# Patient Record
Sex: MALE | Race: WHITE
[De-identification: names, ages, dates, MRNs, and addresses within clinical notes are randomized per-mention and may not be internally consistent; named-entity substitution may affect disease eponyms.]

---

## 2023-01-05 ENCOUNTER — HOSPITAL ENCOUNTER (INPATIENT)
Dept: HOSPITAL 47 - EC | Age: 18
LOS: 5 days | Discharge: HOME HEALTH | DRG: 853 | End: 2023-01-10
Attending: SURGERY | Admitting: SURGERY
Payer: COMMERCIAL

## 2023-01-05 DIAGNOSIS — F17.210: ICD-10-CM

## 2023-01-05 DIAGNOSIS — Z91.013: ICD-10-CM

## 2023-01-05 DIAGNOSIS — E66.09: ICD-10-CM

## 2023-01-05 DIAGNOSIS — K35.33: ICD-10-CM

## 2023-01-05 DIAGNOSIS — Z88.8: ICD-10-CM

## 2023-01-05 DIAGNOSIS — K66.0: ICD-10-CM

## 2023-01-05 DIAGNOSIS — Z79.899: ICD-10-CM

## 2023-01-05 DIAGNOSIS — F32.A: ICD-10-CM

## 2023-01-05 DIAGNOSIS — U07.1: ICD-10-CM

## 2023-01-05 DIAGNOSIS — A41.51: Primary | ICD-10-CM

## 2023-01-05 DIAGNOSIS — K56.7: ICD-10-CM

## 2023-01-05 DIAGNOSIS — Z28.310: ICD-10-CM

## 2023-01-05 LAB
ALBUMIN SERPL-MCNC: 3.6 G/DL (ref 3.5–5)
ALBUMIN/GLOB SERPL: 1.3 {RATIO}
ALP SERPL-CCNC: 54 U/L (ref 38–126)
ALT SERPL-CCNC: 17 U/L (ref 4–49)
ANION GAP SERPL CALC-SCNC: 9 MMOL/L
AST SERPL-CCNC: 19 U/L (ref 17–59)
BASOPHILS # BLD MANUAL: 0.14 K/UL (ref 0–0.2)
BUN SERPL-SCNC: 15 MG/DL (ref 9–20)
CALCIUM SPEC-MCNC: 8.6 MG/DL (ref 8.4–10.2)
CELLS COUNTED: 100
CHLORIDE SERPL-SCNC: 105 MMOL/L (ref 98–107)
CO2 SERPL-SCNC: 22 MMOL/L (ref 22–30)
ERYTHROCYTE [DISTWIDTH] IN BLOOD BY AUTOMATED COUNT: 4.34 M/UL (ref 4.3–5.9)
ERYTHROCYTE [DISTWIDTH] IN BLOOD: 12.3 % (ref 11.5–15.5)
GLOBULIN SER CALC-MCNC: 2.8 G/DL
GLUCOSE SERPL-MCNC: 106 MG/DL (ref 74–99)
HCT VFR BLD AUTO: 37.5 % (ref 39–53)
HGB BLD-MCNC: 13.2 GM/DL (ref 13–17.5)
LYMPHOCYTES # BLD MANUAL: 0.97 K/UL (ref 1–4.8)
MCH RBC QN AUTO: 30.5 PG (ref 25–35)
MCHC RBC AUTO-ENTMCNC: 35.2 G/DL (ref 31–37)
MCV RBC AUTO: 86.5 FL (ref 80–100)
MONOCYTES # BLD MANUAL: 1.11 K/UL (ref 0–1)
NEUTROPHILS NFR BLD MANUAL: 77 %
NEUTS SEG # BLD MANUAL: 11.6 K/UL (ref 1.3–7.7)
PLATELET # BLD AUTO: 241 K/UL (ref 150–450)
POTASSIUM SERPL-SCNC: 4.7 MMOL/L (ref 3.5–5.1)
PROT SERPL-MCNC: 6.4 G/DL (ref 6.3–8.2)
SODIUM SERPL-SCNC: 136 MMOL/L (ref 137–145)
WBC # BLD AUTO: 13.9 K/UL (ref 4–11)

## 2023-01-05 PROCEDURE — 96375 TX/PRO/DX INJ NEW DRUG ADDON: CPT

## 2023-01-05 PROCEDURE — 96366 THER/PROPH/DIAG IV INF ADDON: CPT

## 2023-01-05 PROCEDURE — 96376 TX/PRO/DX INJ SAME DRUG ADON: CPT

## 2023-01-05 PROCEDURE — 84100 ASSAY OF PHOSPHORUS: CPT

## 2023-01-05 PROCEDURE — 87205 SMEAR GRAM STAIN: CPT

## 2023-01-05 PROCEDURE — 87077 CULTURE AEROBIC IDENTIFY: CPT

## 2023-01-05 PROCEDURE — 96365 THER/PROPH/DIAG IV INF INIT: CPT

## 2023-01-05 PROCEDURE — 83605 ASSAY OF LACTIC ACID: CPT

## 2023-01-05 PROCEDURE — 87070 CULTURE OTHR SPECIMN AEROBIC: CPT

## 2023-01-05 PROCEDURE — 87635 SARS-COV-2 COVID-19 AMP PRB: CPT

## 2023-01-05 PROCEDURE — 85025 COMPLETE CBC W/AUTO DIFF WBC: CPT

## 2023-01-05 PROCEDURE — 83735 ASSAY OF MAGNESIUM: CPT

## 2023-01-05 PROCEDURE — 96361 HYDRATE IV INFUSION ADD-ON: CPT

## 2023-01-05 PROCEDURE — 80053 COMPREHEN METABOLIC PANEL: CPT

## 2023-01-05 PROCEDURE — 94760 N-INVAS EAR/PLS OXIMETRY 1: CPT

## 2023-01-05 PROCEDURE — 99285 EMERGENCY DEPT VISIT HI MDM: CPT

## 2023-01-05 PROCEDURE — 87040 BLOOD CULTURE FOR BACTERIA: CPT

## 2023-01-05 PROCEDURE — 87186 SC STD MICRODIL/AGAR DIL: CPT

## 2023-01-05 PROCEDURE — 87075 CULTR BACTERIA EXCEPT BLOOD: CPT

## 2023-01-05 PROCEDURE — 74177 CT ABD & PELVIS W/CONTRAST: CPT

## 2023-01-05 RX ADMIN — MORPHINE SULFATE PRN MG: 4 INJECTION, SOLUTION INTRAMUSCULAR; INTRAVENOUS at 14:37

## 2023-01-05 RX ADMIN — PANTOPRAZOLE SODIUM SCH MG: 40 INJECTION, POWDER, FOR SOLUTION INTRAVENOUS at 07:55

## 2023-01-05 RX ADMIN — KETOROLAC TROMETHAMINE SCH MG: 15 INJECTION, SOLUTION INTRAMUSCULAR; INTRAVENOUS at 22:02

## 2023-01-05 RX ADMIN — ACETAMINOPHEN PRN MG: 325 TABLET, FILM COATED ORAL at 18:44

## 2023-01-05 RX ADMIN — MORPHINE SULFATE PRN MG: 4 INJECTION, SOLUTION INTRAMUSCULAR; INTRAVENOUS at 03:55

## 2023-01-05 RX ADMIN — CEFAZOLIN SCH: 330 INJECTION, POWDER, FOR SOLUTION INTRAMUSCULAR; INTRAVENOUS at 16:05

## 2023-01-05 RX ADMIN — CEFAZOLIN SCH MLS/HR: 330 INJECTION, POWDER, FOR SOLUTION INTRAMUSCULAR; INTRAVENOUS at 07:56

## 2023-01-05 RX ADMIN — MORPHINE SULFATE PRN MG: 4 INJECTION, SOLUTION INTRAMUSCULAR; INTRAVENOUS at 18:43

## 2023-01-05 RX ADMIN — PIPERACILLIN AND TAZOBACTAM SCH MLS/HR: 3; .375 INJECTION, POWDER, FOR SOLUTION INTRAVENOUS at 18:44

## 2023-01-05 RX ADMIN — CEFAZOLIN SCH MLS/HR: 330 INJECTION, POWDER, FOR SOLUTION INTRAMUSCULAR; INTRAVENOUS at 04:01

## 2023-01-05 RX ADMIN — MORPHINE SULFATE PRN MG: 4 INJECTION, SOLUTION INTRAMUSCULAR; INTRAVENOUS at 07:56

## 2023-01-05 RX ADMIN — ACETAMINOPHEN SCH MLS/HR: 10 INJECTION, SOLUTION INTRAVENOUS at 22:03

## 2023-01-05 RX ADMIN — ACETAMINOPHEN PRN MG: 325 TABLET, FILM COATED ORAL at 07:55

## 2023-01-05 NOTE — P.PN
Progress Note - Text


Progress Note Date: 01/05/23





CT of the abdomen and pelvis and appendix were reviewed demonstrate inflammatory

changes of the right lower quadrant fluid consistent with ruptured appendicitis.

 This is more independent interpretation.  No gross free air.  Patient 

clinically presents with fevers, elevated white count consistent with sepsis.  





May benefit from abdominal washout, drain placement, possible appendectomy 

pending severity of inflammatory changes.  





Antibiotics adjusted to Zosyn and Flagyl.  Scheduled Toradol and Tylenol 

started.

## 2023-01-05 NOTE — CT
Examination Type: CT abdomen pelvis w con: 1/5/2023

 

History: Pt states he was told yesterday his appendix burst by a doctor at another hospital.

 

Technique: Departmental protocol.

Total DLP: 1795.4 mGycm, Automated Exposure Control for Dose Reduction was Utilized.

IV contrast: CT scan of the abdomen and pelvis is performed with oral and with IV Contrast, patient i
njected with 100cc mL of Isovue 300.

 

Comparison: None

 

Findings:

 

LUNG BASES: There are bilateral lung base posterior patchy opacities, clinical exclusion of pneumonia
 requested.

LIVER/GB:   No significant abnormality is appreciated.

PANCREAS:  No significant abnormality is seen.

SPLEEN: There is mild splenomegaly. No focal lesions.

ADRENALS:  No significant abnormality is seen.

KIDNEYS:  No significant abnormality is seen.

 

BOWEL: Marked inflammatory changes seen throughout the right lower quadrant. The candidate for the ap
pendix can be seen on series 201 images 70-72, where it is oriented horizontally, just anterior to th
e right psoas. It measures 16mm caliber, has a gas bubble within, and is markedly indistinct due to i
nflammatory changes. There there is a cluster of mildly enlarged ileocecal lymph nodes.

 

PERITONEAL CAVITY: 

*  There is a large volume of peritoneal fluid within the pelvis, which appears cavity dependent and 
not loculated. There is no rim enhancement of this fluid, or gas bubbles within. There is no peritone
al fluid in the abdomen. 

*  However, there are a few tiny bubbles of pneumoperitoneum in the anterior. Hepatic spaces.

 

PELVIC VISCERA: Unremarkable.

LYMPH NODES:  No greater than 1cm abdominal or pelvic lymph nodes are appreciated.

OSSEOUS STRUCTURES:  No significant abnormality is seen.

 

Abnormal results discussed with the patient's nurse Mansi just now, with read back. Nurse Nazario is call
ing the results now to covering physician now. 

 

 

IMPRESSION:  

Findings consistent with perforated appendicitis, with small volume pneumoperitoneum and large bowel 
pelvic peritoneal fluid.

## 2023-01-05 NOTE — P.GSHP
History of Present Illness


H&P Date: 01/05/23





CHIEF COMPLAINT: Right lower quadrant abdominal pain





HISTORY OF PRESENT ILLNESS: This is a 17-year-old male who's been complaining of

right lower quadrant abdominal pain for 1 week.  Patient has been at the [facility].  Patient initially went to [facility] and had a 

computed tomography scan and pelvis which showed extensive inflammatory changes 

in the right lower quadrant with dilated fluid-filled appendix and fluid and fat

stranding in the right pericolic gutter.  Small pneumoperitoneum.  Free fluid in

the pelvis.  This is consistent with appendicitis and rupture.  Patient 

therefore required a transfer to [facility] for surgical evaluation.  

Patient is on IV antibiotics and IV fluids.  Patient also had elevated white 

count.  He denies any nausea or vomiting.  Reports a decreased appetite.  Denies

any fever, chills or sweats.





PAST MEDICAL HISTORY: 


See list.





PAST SURGICAL HISTORY: 


See list.





MEDICATIONS: 


See list.





ALLERGIES: 


See list.





SOCIAL HISTORY: No illicit drug use.  Nicotine dependence.  Marijuana use.





REVIEW OF SYSTEMS: 


CONSTITUTIONAL: Denies fever or chills.


HEENT: Denies blurred vision, vision changes, or eye pain. Denies hemoptysis 


ENDOCRINE: Denies heat or cold intolerance.


CARDIOVASCULAR: Denies chest pain or pressure.


RESPIRATORY: No shortness of breath. 


GASTROINTESTINAL: Please refer to HPI


NEURO: Denies history of seizures.


PSYCH: No depression or suicidal ideation


HEMATOLOGIC: Denies bleeding disorders.


LYMPHATIC:  The patient denies any lumps and bumps around the neck. 


GENITOURINARY:  Denies any blood in urine or increased urinary frequency.  


MUSCULOSKELETAL:  Denies myalgias. Denies joint swelling. Denies decreased range

of motion beyond patients baseline.


SKIN: Denies pruitis. Denies rash.





PHYSICAL EXAM: 


VITAL SIGNS: Reviewed


GENERAL: Well-developed in no acute distress. 


HEENT:  No sclera icterus. Extraocular movements grossly intact.  Moist buccal 

mucosa. 


Head is atraumatic, normocephalic. Hears conversational speech. No nasal 

drainage.


NECK:  Supple without lymphadenopathy.


CHEST:  Non-labored respirations and equal bilateral excursions. 


CARDIOVASCULAR:  Palpable 2+ radial pulses.


ABDOMEN:  Soft.  Nondistended.  Right lower quadrant tenderness


MUSCULOSKELETAL:  No clubbing or cyanosis.


NEUROLOGIC:  No focal or lateralizing signs.  Cranial nerves II through XII 

grossly intact.


PSYCH:  Appropriate affect.  Alert and oriented to person, place and time.


SKIN: Well perfused.  Good skin turgor. 





LABORATORY DATA:


WBC is 13.9 Hgb 13.2 platelets 241


Sodium is 136 potassium 4.7 creatinine 1.04


Total bilirubin 2.6 AST 19 ALT 17 alk phos 54


COVID-19 detected





IMAGING:


computed tomography scan and pelvis which showed extensive inflammatory changes 

in the right lower quadrant with dilated fluid-filled appendix and fluid and fat

stranding in the right pericolic gutter.  Small pneumoperitoneum.  Free fluid in

the pelvis.  This is consistent with appendicitis and rupture. 








ASSESSMENT: 


1.  Acute appendicitis with rupture


2.  COVID-19 positive








PLAN: 


-Continue IV antibiotics


-Continue IV fluids


-Start clear liquid diet


-No plans for surgery today


-Continue supportive care


-Further recommendations forthcoming per surgeon 





Physician Assistant note has been reviewed by physician. Signing provider agrees

with the documented findings, assessment, and plan of care. 





Past Medical History


Past Medical History: Asthma


History of Any Multi-Drug Resistant Organisms: None Reported


Additional Past Surgical History / Comment(s): Right knee surgery


Past Anesthesia/Blood Transfusion Reactions: No Reported Reaction


Past Psychological History: No Psychological Hx Reported


Smoking Status: Current every day smoker


Past Alcohol Use History: Occasional


Past Drug Use History: Marijuana





- Past Family History


  ** Father


History Unknown: Yes





  ** Mother


History Unknown: Yes





Medications and Allergies


                                Home Medications











 Medication  Instructions  Recorded  Confirmed  Type


 


ARIPiprazole [Abilify] 5 mg PO HS 01/05/23 01/05/23 History


 


ARIPiprazole [Abilify] 20 mg PO HS 01/05/23 01/05/23 History


 


Benztropine Mesylate [Cogentin] 1 mg PO BID 01/05/23 01/05/23 History


 


Escitalopram [Lexapro] 20 mg PO DAILY 01/05/23 01/05/23 History


 


traZODone HCL [Desyrel] 50 mg PO DAILY 01/05/23 01/05/23 History








                                    Allergies











Allergy/AdvReac Type Severity Reaction Status Date / Time


 


haloperidol [From Haldol] Allergy  Swelling Verified 01/05/23 07:50


 


seafood Allergy  Unknown Uncoded 01/05/23 03:45














Surgical - Exam


                                   Vital Signs











Temp Pulse Resp BP


 


 99 F   106 H  18   120/67 


 


 01/05/23 03:04  01/05/23 03:04  01/05/23 03:04  01/05/23 03:04














Results





- Labs





                                 01/05/23 08:23





                                 01/05/23 08:23


                  Abnormal Lab Results - Last 24 Hours (Table)











  01/05/23 01/05/23 01/05/23 Range/Units





  04:01 08:23 08:23 


 


WBC   13.9 H   (4.0-11.0)  k/uL


 


Hct   37.5 L   (39.0-53.0)  %


 


Sodium    136 L  (137-145)  mmol/L


 


Glucose    106 H  (74-99)  mg/dL


 


Total Bilirubin    2.6 H  (0.2-1.3)  mg/dL


 


Coronavirus (PCR)  Detected A    (Not Detectd)  








                                 Diabetes panel











  01/05/23 Range/Units





  08:23 


 


Sodium  136 L  (137-145)  mmol/L


 


Potassium  4.7  (3.5-5.1)  mmol/L


 


Chloride  105  ()  mmol/L


 


Carbon Dioxide  22  (22-30)  mmol/L


 


BUN  15  (9-20)  mg/dL


 


Creatinine  1.04  (0.66-1.25)  mg/dL


 


Glucose  106 H  (74-99)  mg/dL


 


Calcium  8.6  (8.4-10.2)  mg/dL


 


AST  19  (17-59)  U/L


 


ALT  17  (4-49)  U/L


 


Alkaline Phosphatase  54  ()  U/L


 


Total Protein  6.4  (6.3-8.2)  g/dL


 


Albumin  3.6  (3.5-5.0)  g/dL








                                  Calcium panel











  01/05/23 Range/Units





  08:23 


 


Calcium  8.6  (8.4-10.2)  mg/dL


 


Albumin  3.6  (3.5-5.0)  g/dL








                                 Pituitary panel











  01/05/23 Range/Units





  08:23 


 


Sodium  136 L  (137-145)  mmol/L


 


Potassium  4.7  (3.5-5.1)  mmol/L


 


Chloride  105  ()  mmol/L


 


Carbon Dioxide  22  (22-30)  mmol/L


 


BUN  15  (9-20)  mg/dL


 


Creatinine  1.04  (0.66-1.25)  mg/dL


 


Glucose  106 H  (74-99)  mg/dL


 


Calcium  8.6  (8.4-10.2)  mg/dL








                                  Adrenal panel











  01/05/23 Range/Units





  08:23 


 


Sodium  136 L  (137-145)  mmol/L


 


Potassium  4.7  (3.5-5.1)  mmol/L


 


Chloride  105  ()  mmol/L


 


Carbon Dioxide  22  (22-30)  mmol/L


 


BUN  15  (9-20)  mg/dL


 


Creatinine  1.04  (0.66-1.25)  mg/dL


 


Glucose  106 H  (74-99)  mg/dL


 


Calcium  8.6  (8.4-10.2)  mg/dL


 


Total Bilirubin  2.6 H  (0.2-1.3)  mg/dL


 


AST  19  (17-59)  U/L


 


ALT  17  (4-49)  U/L


 


Alkaline Phosphatase  54  ()  U/L


 


Total Protein  6.4  (6.3-8.2)  g/dL


 


Albumin  3.6  (3.5-5.0)  g/dL

## 2023-01-05 NOTE — ED
Recheck HPI





- General


Stated Complaint: appendicitis


Time Seen by Provider: 01/05/23 03:01


Source: RN notes reviewed, old records reviewed


Mode of arrival: EMS


Limitations: no limitations





- History of Present Illness


Initial Comments: 





This is a 17-year-old male DF for evaluation presents today for evaluation of 

abdominal pain known coronavirus and acute appendicitis.  Patient has pain 

control currently feels well


MD Complaint: abnormal lab, needs IV antibiotics


-: days(s)


Returns Today for: needs IV antibiotics, persistent/worsening pain related to 

initial visit


Symptoms Since Prior Visit: worsening pain, fever


Context: planned re-check


Associated Symptoms: fever, abdominal pain


Treatments Prior to Arrival: Given Antibiotics on, Given Pain Meds on





Review of Systems


ROS Statement: 


Those systems with pertinent positive or pertinent negative responses have been 

documented in the HPI.





ROS Other: All systems not noted in ROS Statement are negative.





General Exam


General appearance: alert, in no apparent distress


Head exam: Present: atraumatic, normocephalic, normal inspection


Eye exam: Present: normal appearance, PERRL, EOMI.  Absent: scleral icterus, 

conjunctival injection, periorbital swelling


ENT exam: Present: normal exam, mucous membranes moist


Neck exam: Present: normal inspection.  Absent: tenderness, meningismus, 

lymphadenopathy


Respiratory exam: Present: normal lung sounds bilaterally.  Absent: respiratory 

distress, wheezes, rales, rhonchi, stridor


Cardiovascular Exam: Present: regular rate, normal rhythm, normal heart sounds. 

Absent: systolic murmur, diastolic murmur, rubs, gallop, clicks


GI/Abdominal exam: Present: soft, normal bowel sounds.  Absent: distended, 

tenderness, guarding, rebound, rigid


Extremities exam: Present: normal inspection, full ROM, normal capillary refill.

 Absent: tenderness, pedal edema, joint swelling, calf tenderness


Back exam: Present: normal inspection


Neurological exam: Present: alert, oriented X3, CN II-XII intact


Psychiatric exam: Present: normal affect, normal mood


Skin exam: Present: warm, dry, intact, normal color.  Absent: rash





Course


                                   Vital Signs











  01/05/23





  03:04


 


Temperature 99 F


 


Pulse Rate 106 H


 


Respiratory 18





Rate 


 


Blood Pressure 120/67














- Reevaluation(s)


Reevaluation #1: 





01/05/23 03:16


Medical record transferring paperwork is reviewed


Reevaluation #2: 





01/05/23 03:16


Patient's pain is controlled


Reevaluation #3: 





01/05/23 03:16


Patient informed results and questions answered


Reevaluation #4: 





01/05/23 03:16


Differential Abdominal Pain Men:


Appendicitis, cholecystitis, diverticulosis, ischemic bowel, pancreatitis, 

hepatitis, UTI, gastroenteritis, AAA, incarcerated hernia, bowel obstruction, 

constipation, inflammatory bowel, hepatitis, peptic ulcer disease, splenic 

infarction, perforated viscus, testicular torsion, this is not meant to be an 

all-inclusive list


Reevaluation #5: 





01/05/23 03:16


Was pt. sent in by a medical professional or institution?


@  -no


Did you speak to anyone other than the patient for history?  


@  -EMS< previous hospital physician


Did you review nursing and triage notes? 


@  -agree


Were old charts reviewed? 


@  -outside hospital chart is thoroughly revoewed


Differential Diagnosis? 


@  -prior


EKG interpreted by me (3pts min.)?


@  -[none]


X-rays interpreted by me (1pt min.)?


@  -[none]


CT interpreted by me (1pt min.)?


@  -[none]


U/S interpreted by me (1pt. min.)?


@  -[none]


What testing was considered but not performed? (CT, X-rays, U/S, labs)? Why?


@  no


What meds were considered but not given? Why?


@  -[none]


Did you discuss the management of the patient with other professionals?


@  -no


Did you reconcile home meds?


@  -[none]


Was smoking cessation discussed for >3mins.?


@  -[none]


Was critical care preformed (if so, how long)?


@  -[none]


Were there social determinants of health that impacted care today? How? 

(Homelessness, low income, unemployed, alcoholism, drug addiction, 

transportation, low edu. Level, literacy, decrease access to med. care, assisted, 

rehab)?


@  -no


Was there de-escalation of care discussed even if they declined? (Discuss DNR or

 withdrawal of care, Hospice)?


@  -no


What co-morbidities impacted this encounter? (DM, HTN, Smoking, COPD, CAD, 

Cancer, CVA, Hep., AIDS, mental health diagnosis, sleep apnea, morbid obesity)?


@  -no


Was patient admitted / discharged?


@  -admit 


Undiagnosed new problem with uncertain prognosis?


@  -[none]


Drug Therapy requiring intensive monitoring for toxicity (Heparin, Nitro, 

Insulin, Cardizem)?


@  -[none]


Were any procedures done?


@  -[none]


Diagnosis/symptom?


@  -[default]


Acute, or Chronic, or Acute on Chronic?


@  -[default]


Uncomplicated (without systemic symptoms) or Complicated (systemic symptoms)?


@  -[default]


Side effects of treatment?


@  -[none]


Exacerbation, Progression, or Severe Exacerbation]


@  -[no]


Poses a threat to life or bodily function?


@  -[no]





Medical Decision Making





- Medical Decision Making





16 male DF for evaluation acute appendicitis patient except just transfer for 

treatment appendicitis also found to be positive for coronavirus





Disposition


Clinical Impression: 


 Abdominal pain, Acute appendicitis, Coronavirus infection





Disposition: ADMITTED AS IP TO THIS [facility]


Condition: Serious


Is patient prescribed a controlled substance at d/c from ED?: No


Referrals: 


Jorge García, PARVIZ [Primary Care Provider] - 1-2 days


Time of Disposition: 03:20

## 2023-01-06 LAB
ALBUMIN SERPL-MCNC: 3.4 G/DL (ref 3.8–4.9)
ALBUMIN/GLOB SERPL: 1.42 G/DL (ref 1.6–3.17)
ALP SERPL-CCNC: 61 U/L (ref 41–126)
ALT SERPL-CCNC: 12 U/L (ref 10–49)
ANION GAP SERPL CALC-SCNC: 10.3 MMOL/L (ref 10–18)
AST SERPL-CCNC: 14 U/L (ref 14–35)
BASOPHILS # BLD AUTO: 0.08 X 10*3/UL (ref 0–0.1)
BASOPHILS NFR BLD AUTO: 0.4 %
BUN SERPL-SCNC: 19.4 MG/DL (ref 9–27)
BUN/CREAT SERPL: 16.17 RATIO (ref 12–20)
CALCIUM SPEC-MCNC: 9.1 MG/DL (ref 8.7–10.3)
CHLORIDE SERPL-SCNC: 98 MMOL/L (ref 96–109)
CO2 SERPL-SCNC: 23.7 MMOL/L (ref 20–27.5)
EOSINOPHIL # BLD AUTO: 0.05 X 10*3/UL (ref 0.04–0.35)
EOSINOPHIL NFR BLD AUTO: 0.3 %
ERYTHROCYTE [DISTWIDTH] IN BLOOD BY AUTOMATED COUNT: 4.26 X 10*6/UL (ref 4.4–5.6)
ERYTHROCYTE [DISTWIDTH] IN BLOOD: 12.3 % (ref 11.5–14.5)
GLOBULIN SER CALC-MCNC: 2.4 G/DL (ref 1.6–3.3)
GLUCOSE SERPL-MCNC: 88 MG/DL (ref 70–110)
HCT VFR BLD AUTO: 37.7 % (ref 39.6–50)
HGB BLD-MCNC: 12.2 G/DL (ref 13–17)
IMM GRANULOCYTES BLD QL AUTO: 0.7 %
LYMPHOCYTES # SPEC AUTO: 0.81 X 10*3/UL (ref 0.9–5)
LYMPHOCYTES NFR SPEC AUTO: 4.2 %
MAGNESIUM SPEC-SCNC: 1.5 MG/DL (ref 1.5–2.4)
MCH RBC QN AUTO: 28.6 PG (ref 27–32)
MCHC RBC AUTO-ENTMCNC: 32.4 G/DL (ref 32–37)
MCV RBC AUTO: 88.5 FL (ref 80–97)
MONOCYTES # BLD AUTO: 1.16 X 10*3/UL (ref 0.2–1)
MONOCYTES NFR BLD AUTO: 6 %
NEUTROPHILS # BLD AUTO: 17.05 X 10*3/UL (ref 1.8–7.7)
NEUTROPHILS NFR BLD AUTO: 88.4 %
NRBC BLD AUTO-RTO: 0 /100 WBCS (ref 0–0)
PLATELET # BLD AUTO: 282 X 10*3/UL (ref 140–440)
POTASSIUM SERPL-SCNC: 4.4 MMOL/L (ref 3.5–5.5)
PROT SERPL-MCNC: 5.8 G/DL (ref 6.2–8.2)
RBC MORPH BLD: NORMAL
SODIUM SERPL-SCNC: 132 MMOL/L (ref 135–145)
WBC # BLD AUTO: 19.29 X 10*3/UL (ref 4.5–10)

## 2023-01-06 PROCEDURE — 0DNJ4ZZ RELEASE APPENDIX, PERCUTANEOUS ENDOSCOPIC APPROACH: ICD-10-PCS

## 2023-01-06 PROCEDURE — 8E0W4CZ ROBOTIC ASSISTED PROCEDURE OF TRUNK REGION, PERCUTANEOUS ENDOSCOPIC APPROACH: ICD-10-PCS

## 2023-01-06 PROCEDURE — 3E1M48Z IRRIGATION OF PERITONEAL CAVITY USING IRRIGATING SUBSTANCE, PERCUTANEOUS ENDOSCOPIC APPROACH: ICD-10-PCS

## 2023-01-06 PROCEDURE — 0DTJ4ZZ RESECTION OF APPENDIX, PERCUTANEOUS ENDOSCOPIC APPROACH: ICD-10-PCS

## 2023-01-06 PROCEDURE — 0W9G30Z DRAINAGE OF PERITONEAL CAVITY WITH DRAINAGE DEVICE, PERCUTANEOUS APPROACH: ICD-10-PCS

## 2023-01-06 RX ADMIN — METRONIDAZOLE SCH MLS/HR: 500 INJECTION, SOLUTION INTRAVENOUS at 00:51

## 2023-01-06 RX ADMIN — ONDANSETRON SCH: 2 INJECTION INTRAMUSCULAR; INTRAVENOUS at 17:35

## 2023-01-06 RX ADMIN — PIPERACILLIN AND TAZOBACTAM SCH MLS/HR: 3; .375 INJECTION, POWDER, FOR SOLUTION INTRAVENOUS at 08:26

## 2023-01-06 RX ADMIN — KETOROLAC TROMETHAMINE SCH MG: 15 INJECTION, SOLUTION INTRAMUSCULAR; INTRAVENOUS at 08:25

## 2023-01-06 RX ADMIN — CEFAZOLIN SCH: 330 INJECTION, POWDER, FOR SOLUTION INTRAMUSCULAR; INTRAVENOUS at 17:35

## 2023-01-06 RX ADMIN — ACETAMINOPHEN SCH: 10 INJECTION, SOLUTION INTRAVENOUS at 17:34

## 2023-01-06 RX ADMIN — METRONIDAZOLE SCH MLS/HR: 500 INJECTION, SOLUTION INTRAVENOUS at 05:06

## 2023-01-06 RX ADMIN — ONDANSETRON PRN MG: 2 INJECTION INTRAMUSCULAR; INTRAVENOUS at 08:26

## 2023-01-06 RX ADMIN — KETOROLAC TROMETHAMINE SCH MG: 15 INJECTION, SOLUTION INTRAMUSCULAR; INTRAVENOUS at 21:16

## 2023-01-06 RX ADMIN — ACETAMINOPHEN SCH MLS/HR: 10 INJECTION, SOLUTION INTRAVENOUS at 04:30

## 2023-01-06 RX ADMIN — KETOROLAC TROMETHAMINE SCH MG: 15 INJECTION, SOLUTION INTRAMUSCULAR; INTRAVENOUS at 04:30

## 2023-01-06 RX ADMIN — METRONIDAZOLE SCH: 500 INJECTION, SOLUTION INTRAVENOUS at 17:35

## 2023-01-06 RX ADMIN — CEFAZOLIN SCH MLS/HR: 330 INJECTION, POWDER, FOR SOLUTION INTRAMUSCULAR; INTRAVENOUS at 08:26

## 2023-01-06 RX ADMIN — PANTOPRAZOLE SODIUM SCH MG: 40 INJECTION, POWDER, FOR SOLUTION INTRAVENOUS at 08:26

## 2023-01-06 RX ADMIN — ONDANSETRON SCH: 2 INJECTION INTRAMUSCULAR; INTRAVENOUS at 00:52

## 2023-01-06 RX ADMIN — ONDANSETRON SCH MG: 2 INJECTION INTRAMUSCULAR; INTRAVENOUS at 05:06

## 2023-01-06 RX ADMIN — PIPERACILLIN AND TAZOBACTAM SCH MLS/HR: 3; .375 INJECTION, POWDER, FOR SOLUTION INTRAVENOUS at 03:12

## 2023-01-06 RX ADMIN — ACETAMINOPHEN SCH MLS/HR: 10 INJECTION, SOLUTION INTRAVENOUS at 08:26

## 2023-01-06 RX ADMIN — SIMETHICONE SCH MG: 20 SUSPENSION/ DROPS ORAL at 14:55

## 2023-01-06 RX ADMIN — PIPERACILLIN AND TAZOBACTAM SCH: 3; .375 INJECTION, POWDER, FOR SOLUTION INTRAVENOUS at 17:35

## 2023-01-06 RX ADMIN — ONDANSETRON SCH: 2 INJECTION INTRAMUSCULAR; INTRAVENOUS at 23:21

## 2023-01-06 RX ADMIN — ENOXAPARIN SODIUM SCH: 30 INJECTION SUBCUTANEOUS at 00:00

## 2023-01-06 RX ADMIN — SIMETHICONE SCH: 20 SUSPENSION/ DROPS ORAL at 17:35

## 2023-01-06 RX ADMIN — METRONIDAZOLE SCH MLS/HR: 500 INJECTION, SOLUTION INTRAVENOUS at 23:20

## 2023-01-06 RX ADMIN — KETOROLAC TROMETHAMINE SCH: 15 INJECTION, SOLUTION INTRAMUSCULAR; INTRAVENOUS at 17:35

## 2023-01-06 RX ADMIN — ENOXAPARIN SODIUM SCH: 30 INJECTION SUBCUTANEOUS at 08:01

## 2023-01-06 RX ADMIN — SIMETHICONE SCH MG: 20 SUSPENSION/ DROPS ORAL at 21:17

## 2023-01-06 RX ADMIN — ONDANSETRON SCH MG: 2 INJECTION INTRAMUSCULAR; INTRAVENOUS at 10:50

## 2023-01-06 RX ADMIN — METRONIDAZOLE SCH MLS/HR: 500 INJECTION, SOLUTION INTRAVENOUS at 15:07

## 2023-01-06 RX ADMIN — CEFAZOLIN SCH MLS/HR: 330 INJECTION, POWDER, FOR SOLUTION INTRAMUSCULAR; INTRAVENOUS at 04:31

## 2023-01-06 NOTE — P.OP
Date of Procedure: 01/06/23


Description of Procedure: 











SURGEON:  GEO MENA MD





Preoperative Diagnosis: 


1.  Ruptured appendicitis


2.  Peritonitis, generalized


3.  Sepsis due to ruptured appendicitis


4.  Obesity due to excess calories, BMI 34.5


5.  Depressive disorder


6.  Acute coronavirus infection


7.  Tobacco abuse disorder





Postoperative Diagnosis: 


1.  Gangrenous ruptured appendicitis with appendiceal abscess with phlegmon


2.  Peritonitis, diffuse and generalized


3.  Sepsis due to ruptured appendicitis


4.  Obesity due to excess calories, BMI 34.5


5.  Depressive disorder


6.  Ileus due to ruptured appendicitis


7.  Diffuse peritoneal adhesions due to infection


8.  Acute coronavirus infection


9.  Tobacco abuse disorder





Procedure(s) Performed: 


1. Robotic-assisted daVinci Xi laparoscopic lysis of adhesions over 1 hour


2. Drainage of periappendiceal abscess, over 50 mL


3. Placement of RUPERTO drain #19 right lower quadrant/pelvis


4. Peritoneal lavage 2000 mL normal saline





Anesthesia: GETA, local


Estimated Blood Loss (ml): 5


Pathology: other (aerobic and anerobic culture of peritoneal fluid from 

peritonitis)


Condition: stable


Disposition: floor





Operative Findings: 


1. Localized abscess over 50-mL drained right lower quadrant


2. Gangrenous ruptured purulent appendicitis encased in phlegmon able to dissect


3. Abdomen irrigated with 2000-mL normal saline


4. RUPERTO drain placed at right lower quadrant of abscess pocket drained


5.  Diffuse ileus


6.  Severe intra-abdominal adhesions due to peritonitis





INDICATIONS: The patient is a 17-year-old male who presents with acute rupture 

appendicitis including fevers and peritonitis consistent with sepsis.  Surgical 

intervention was described in detail including abdominal washout, drainage of 

appendiceal abscess and possible appendectomy. Benefits and risks, including 

infection, open surgery, and possibility for additional surgery was discussed at

length. Informed consent was obtained. All questions of the patient and family 

were answered.





DESCRIPTION:  The patient was transferred to the operating room and placed in 

supine position. The abdomen was then prepped and draped in standard sterile 

fashion as Ioban was placed along the abdomen to minimize any contamination of 

skin floor. 





After a timeout protocol was performed, attention was then brought to the left 

upper quadrant whereby a 0 degree 5 mm laparoscopic trocar entry was performed. 

The abdominal cavity was entered and insufflated to 15 mmHg pressure, which was 

tolerated well. Diagnostic laparoscopy demonstrated no injury to viscera or 

mesentery.  Post-infective generalized peritoneal adhesions were identified 

throughout the abdomen gently dissected with the camera.





Next a robotic 8-mm trocar was placed along the left upper quadrant after 

exchanging the 5 mm trocar. A 8 mm port was placed along the left lower quadrant

and another 8-mm port left lateral abdominal wall.  Ports were placed 10 cm 

apart from each other including 15-20 cm away from the target anatomy of the 

right pelvis.  The patient was then placed in Trendelenburg position, at least 

7 and right side up at least 7. 





The robotic da Yulissa XI system was primed and docked from the left side of the 

patient. 





Using atraumatic graspers and vessel sealer, the robotic system was docked and 

primed as described. Instruments were interchanged by the assistant including 

graspers, robotic stapler and suction .





Next, attention was brought to identify the cecum.  





A systematic view within the abdominal cavity was started with the small bowel 

which was remarkable for diffuse fibrinous exudate throughout the pelvis.  The 

base of the cecum was with inflammation and phlegmonous reaction identified.  

The appendix was ruptured near the base with moderate dissection performed. The 

abscess of 50-mL was aspirated from the abdomen and pelvis.  Extensive lysis of 

adhesions over 1 hour was used to dissect injury and interloop abscesses.





The abdomen was irrigated with 2000 mL normal saline to the aspirant was clear. 





The robot was undocked.  I re-scrubbed into the case.  





A round #19 drain was placed via the left lower quadrant port and positioned at 

the right lower quadrant and pelvis.  A drain stitch 2-0 nylon was placed with 

the bulb attached separately.





All instruments and pneumoperitoneum were evacuated from the abdominal cavity. 





Local anesthetic was infiltrated to all wounds for postop analgesia.  All 

incisions were also cleansed with diluted hydrogen peroxide.  An Optifoam 

surgical dressing was placed over all port sites including drain site as he has 

elevated risk for infection.





The patient had tolerated the procedure well. The patient was extubated 

successfully. 





The patient was transferred to the postanesthesia care unit in stable condition.







Interval appendectomy previously described to patient prior to surgery due to 

dense and phlegmonous reaction.

## 2023-01-07 LAB
ALBUMIN SERPL-MCNC: 3 G/DL (ref 3.8–4.9)
ALBUMIN/GLOB SERPL: 1.3 G/DL (ref 1.6–3.17)
ALP SERPL-CCNC: 63 U/L (ref 41–126)
ALT SERPL-CCNC: 9 U/L (ref 10–49)
ANION GAP SERPL CALC-SCNC: 12.9 MMOL/L (ref 10–18)
AST SERPL-CCNC: 14 U/L (ref 14–35)
BASOPHILS # BLD AUTO: 0.08 X 10*3/UL (ref 0–0.1)
BASOPHILS NFR BLD AUTO: 0.5 %
BUN SERPL-SCNC: 18.3 MG/DL (ref 9–27)
BUN/CREAT SERPL: 18.3 RATIO (ref 12–20)
CALCIUM SPEC-MCNC: 8.1 MG/DL (ref 8.7–10.3)
CHLORIDE SERPL-SCNC: 100 MMOL/L (ref 96–109)
CO2 SERPL-SCNC: 21.1 MMOL/L (ref 20–27.5)
EOSINOPHIL # BLD AUTO: 0.18 X 10*3/UL (ref 0.04–0.35)
EOSINOPHIL NFR BLD AUTO: 1 %
ERYTHROCYTE [DISTWIDTH] IN BLOOD BY AUTOMATED COUNT: 4.02 X 10*6/UL (ref 4.4–5.6)
ERYTHROCYTE [DISTWIDTH] IN BLOOD: 12.4 % (ref 11.5–14.5)
GLOBULIN SER CALC-MCNC: 2.3 G/DL (ref 1.6–3.3)
GLUCOSE SERPL-MCNC: 92 MG/DL (ref 70–110)
HCT VFR BLD AUTO: 35.5 % (ref 39.6–50)
HGB BLD-MCNC: 11.6 G/DL (ref 13–17)
IMM GRANULOCYTES BLD QL AUTO: 1.2 %
LYMPHOCYTES # SPEC AUTO: 1.08 X 10*3/UL (ref 0.9–5)
LYMPHOCYTES NFR SPEC AUTO: 6.3 %
MCH RBC QN AUTO: 28.9 PG (ref 27–32)
MCHC RBC AUTO-ENTMCNC: 32.7 G/DL (ref 32–37)
MCV RBC AUTO: 88.3 FL (ref 80–97)
MONOCYTES # BLD AUTO: 0.83 X 10*3/UL (ref 0.2–1)
MONOCYTES NFR BLD AUTO: 4.8 %
NEUTROPHILS # BLD AUTO: 14.9 X 10*3/UL (ref 1.8–7.7)
NEUTROPHILS NFR BLD AUTO: 86.2 %
NRBC BLD AUTO-RTO: 0 /100 WBCS (ref 0–0)
PLATELET # BLD AUTO: 300 X 10*3/UL (ref 140–440)
POTASSIUM SERPL-SCNC: 4.2 MMOL/L (ref 3.5–5.5)
PROT SERPL-MCNC: 5.3 G/DL (ref 6.2–8.2)
SODIUM SERPL-SCNC: 134 MMOL/L (ref 135–145)
WBC # BLD AUTO: 17.28 X 10*3/UL (ref 4.5–10)

## 2023-01-07 RX ADMIN — PANTOPRAZOLE SODIUM SCH MG: 40 INJECTION, POWDER, FOR SOLUTION INTRAVENOUS at 08:23

## 2023-01-07 RX ADMIN — KETOROLAC TROMETHAMINE SCH MG: 15 INJECTION, SOLUTION INTRAMUSCULAR; INTRAVENOUS at 04:37

## 2023-01-07 RX ADMIN — SIMETHICONE SCH MG: 20 SUSPENSION/ DROPS ORAL at 08:23

## 2023-01-07 RX ADMIN — CEFAZOLIN SCH MLS/HR: 330 INJECTION, POWDER, FOR SOLUTION INTRAMUSCULAR; INTRAVENOUS at 02:10

## 2023-01-07 RX ADMIN — METRONIDAZOLE SCH MLS/HR: 500 INJECTION, SOLUTION INTRAVENOUS at 06:02

## 2023-01-07 RX ADMIN — KETOROLAC TROMETHAMINE SCH MG: 15 INJECTION, SOLUTION INTRAMUSCULAR; INTRAVENOUS at 17:28

## 2023-01-07 RX ADMIN — ENOXAPARIN SODIUM SCH MG: 30 INJECTION SUBCUTANEOUS at 08:24

## 2023-01-07 RX ADMIN — CEFAZOLIN SCH MLS/HR: 330 INJECTION, POWDER, FOR SOLUTION INTRAMUSCULAR; INTRAVENOUS at 21:50

## 2023-01-07 RX ADMIN — METRONIDAZOLE SCH MLS/HR: 500 INJECTION, SOLUTION INTRAVENOUS at 17:27

## 2023-01-07 RX ADMIN — PIPERACILLIN AND TAZOBACTAM SCH MLS/HR: 3; .375 INJECTION, POWDER, FOR SOLUTION INTRAVENOUS at 02:09

## 2023-01-07 RX ADMIN — KETOROLAC TROMETHAMINE SCH MG: 15 INJECTION, SOLUTION INTRAMUSCULAR; INTRAVENOUS at 10:58

## 2023-01-07 RX ADMIN — CEFAZOLIN SCH MLS/HR: 330 INJECTION, POWDER, FOR SOLUTION INTRAMUSCULAR; INTRAVENOUS at 12:07

## 2023-01-07 RX ADMIN — KETOROLAC TROMETHAMINE SCH MG: 15 INJECTION, SOLUTION INTRAMUSCULAR; INTRAVENOUS at 21:48

## 2023-01-07 RX ADMIN — HYDROMORPHONE HYDROCHLORIDE PRN MG: 1 INJECTION, SOLUTION INTRAMUSCULAR; INTRAVENOUS; SUBCUTANEOUS at 12:07

## 2023-01-07 RX ADMIN — PIPERACILLIN AND TAZOBACTAM SCH MLS/HR: 3; .375 INJECTION, POWDER, FOR SOLUTION INTRAVENOUS at 10:58

## 2023-01-07 RX ADMIN — ONDANSETRON PRN MG: 2 INJECTION INTRAMUSCULAR; INTRAVENOUS at 08:22

## 2023-01-07 RX ADMIN — HYDROMORPHONE HYDROCHLORIDE PRN MG: 1 INJECTION, SOLUTION INTRAMUSCULAR; INTRAVENOUS; SUBCUTANEOUS at 19:01

## 2023-01-07 RX ADMIN — SIMETHICONE SCH MG: 20 SUSPENSION/ DROPS ORAL at 21:48

## 2023-01-07 RX ADMIN — METRONIDAZOLE SCH MLS/HR: 500 INJECTION, SOLUTION INTRAVENOUS at 12:19

## 2023-01-07 RX ADMIN — PIPERACILLIN AND TAZOBACTAM SCH MLS/HR: 3; .375 INJECTION, POWDER, FOR SOLUTION INTRAVENOUS at 17:28

## 2023-01-07 RX ADMIN — ONDANSETRON SCH MG: 2 INJECTION INTRAMUSCULAR; INTRAVENOUS at 17:28

## 2023-01-07 RX ADMIN — ONDANSETRON SCH: 2 INJECTION INTRAMUSCULAR; INTRAVENOUS at 06:02

## 2023-01-07 RX ADMIN — ONDANSETRON SCH MG: 2 INJECTION INTRAMUSCULAR; INTRAVENOUS at 12:07

## 2023-01-07 RX ADMIN — SIMETHICONE SCH: 20 SUSPENSION/ DROPS ORAL at 17:46

## 2023-01-07 RX ADMIN — SIMETHICONE SCH: 20 SUSPENSION/ DROPS ORAL at 15:02

## 2023-01-07 NOTE — P.PN
Subjective


Progress Note Date: 01/07/23


Principal diagnosis: 





Acute appendicitis





Patient complaining of nausea earlier today.  Was having some vomiting.  Pain is

about the same today as it was yesterday preoperatively.  RUPERTO drain is serous and

was.  He is afebrile.  White blood cell count slightly improved at 17.





Objective





- Vital Signs


Vital signs: 


                                   Vital Signs











Temp  98.5 F   01/07/23 07:39


 


Pulse  93   01/07/23 08:00


 


Resp  16   01/07/23 07:39


 


BP  95/56   01/07/23 07:39


 


Pulse Ox  93 L  01/07/23 07:39


 


FiO2      








                                 Intake & Output











 01/06/23 01/07/23 01/07/23





 18:59 06:59 18:59


 


Intake Total 1100  


 


Output Total 5  90


 


Balance 1095  -90


 


Intake:   


 


  IV 1100  


 


Output:   


 


  Drainage   90


 


    Left Lower Abdomen   90


 


  Estimated Blood Loss 5  


 


Other:   


 


  # Voids  0 1














- Exam





Abdomen: Soft, mild diffuse tenderness increased right lower quadrant, dressings

clean and dry, RUPERTO in place





- Labs


CBC & Chem 7: 


                                 01/07/23 07:00





                                 01/07/23 07:00


Labs: 


                  Abnormal Lab Results - Last 24 Hours (Table)











  01/07/23 01/07/23 Range/Units





  07:00 07:00 


 


WBC  17.28 H   (4.50-10.00)  X 10*3/uL


 


RBC  4.02 L   (4.40-5.60)  X 10*6/uL


 


Hgb  11.6 L   (13.0-17.0)  g/dL


 


Hct  35.5 L   (39.6-50.0)  %


 


Immature Gran #  0.21 H   (0.00-0.04)  X 10*3/uL


 


Neutrophils #  14.90 H   (1.80-7.70)  X 10*3/uL


 


Sodium   134 L  (135-145)  mmol/L


 


Calcium   8.1 L  (8.7-10.3)  mg/dL


 


ALT   9 L  (10-49)  U/L


 


Total Protein   5.3 L  (6.2-8.2)  g/dL


 


Albumin   3.0 L  (3.8-4.9)  g/dL


 


Albumin/Globulin Ratio   1.30 L  (1.60-3.17)  g/dL








                      Microbiology - Last 24 Hours (Table)











 01/05/23 18:08 Blood Culture - Preliminary





 Blood    No Growth after 24 hours














Assessment and Plan


(1) Acute appendicitis


Narrative/Plan: 


17-year-old male with acute appendicitis with phlegmon formation.  Underwent 

washout yesterday.  Slightly improved today.  Repeat CBC tomorrow.  Continue 

clear liquids.  Increase activity.


Current Visit: Yes   Status: Acute   Code(s): K35.80 - UNSPECIFIED ACUTE AP

PENDICITIS   SNOMED Code(s): 90257894

## 2023-01-07 NOTE — P.CONS
History of Present Illness





- Reason for Consult


Consult date: 01/07/23


Perforated appendicitis, sepsis


Requesting physician: Jaylin Kirby





- Chief Complaint


Abdominal pain 2 week





- History of Present Illness


Patient is a 17 year old  male presented to hospital with abdominal 

pain therapy and has been going on for about 2 weeks patient describing the pain

to be sharp mostly across the lower abdominal area intensity has progressively 

get worse or the last 2 weeks to be almost 10 out of 10 with associated nausea 

and vomiting but no diarrhea patient initially presented to [facility] 

with the patient did have a computed tomography scan of abdominal pelvis we did 

shows extensive extremity changes in the right lower quadrant and dilated fluid-

filled appendix fluid and fat stranding in the right paracolic gutter, patient 

was evaluated by general surgery and the patient was taken to the OR and status 

post drainage of the localized abscess RUPERTO drain placement appendectomy could not

be done because it was encased in phlegmon, patient was started on Zosyn and 

Flagyl infectious disease was consulted for further management of antibiotic 

therapy.


Patient to presentation hospital have a fever of 102F patient was tachycardic 

and did have elevated white count of 13.9 which is up to 17.28 today, kidney 

function and liver enzymes are normal and the patient was tested positive for 

covid however the patient do not have significant respiratory symptoms of 

shortness of breath and cough








Review of Systems


Positive point has been  mentioned in the HPI rest of the systems are negative








Past Medical History


Past Medical History: Asthma


History of Any Multi-Drug Resistant Organisms: None Reported


Additional Past Surgical History / Comment(s): Right knee surgery


Past Anesthesia/Blood Transfusion Reactions: No Reported Reaction


Past Psychological History: No Psychological Hx Reported


Smoking Status: Current every day smoker


Past Alcohol Use History: Occasional


Past Drug Use History: Marijuana





- Past Family History


  ** Father


History Unknown: Yes





  ** Mother


History Unknown: Yes





Medications and Allergies


                                Home Medications











 Medication  Instructions  Recorded  Confirmed  Type


 


ARIPiprazole [Abilify] 5 mg PO HS 01/05/23 01/05/23 History


 


ARIPiprazole [Abilify] 20 mg PO HS 01/05/23 01/05/23 History


 


Benztropine Mesylate [Cogentin] 1 mg PO BID 01/05/23 01/05/23 History


 


Escitalopram [Lexapro] 20 mg PO DAILY 01/05/23 01/05/23 History


 


traZODone HCL [Desyrel] 50 mg PO DAILY 01/05/23 01/05/23 History


 


Ciprofloxacin HCl [Cipro] 500 mg PO Q12HR 14 Days #28 tab 01/10/23  Rx


 


HYDROcodone/APAP 5-325MG [Norco 1 tab PO Q6HR PRN 3 Days #12 tab 01/10/23  Rx





5-325]    


 


Ibuprofen [Motrin] 600 mg PO Q8HR PRN #30 tab 01/10/23  Rx


 


Simethicone 40 mg/0.6 ml Drops 80 mg PO PCHS  ml 01/10/23  Rx





[Mylicon Drops]    


 


metroNIDAZOLE [Flagyl] 500 mg PO TID 14 Days #42 tab 01/10/23  Rx








                                    Allergies











Allergy/AdvReac Type Severity Reaction Status Date / Time


 


haloperidol [From Haldol] Allergy  Swelling Verified 01/05/23 07:50


 


seafood Allergy  Unknown Uncoded 01/05/23 03:45














Physical Exam


Vitals: 


                                   Vital Signs











  Temp Pulse Pulse Resp BP Pulse Ox


 


 01/07/23 08:00   93  82   


 


 01/07/23 07:39  98.5 F   82  16  95/56  93 L


 


 01/07/23 02:14  98.3 F   107 H  15  90/51  94 L


 


 01/06/23 21:21    100    92 L


 


 01/06/23 21:12    80   96/55  100


 


 01/06/23 20:56    94   95/54  99


 


 01/06/23 20:41    85   107/66  99


 


 01/06/23 20:26    84   103/67  99


 


 01/06/23 20:11    92   104/67  99


 


 01/06/23 19:56    96   102/41  99


 


 01/06/23 19:41    98   105/69  98


 


 01/06/23 19:26    93   102/64  98


 


 01/06/23 19:12    96   99/60 


 


 01/06/23 18:57    96   104/62  100


 


 01/06/23 18:42    101 H   105/58  98


 


 01/06/23 14:00  97.9 F   104 H  18  105/71  95








                                Intake and Output











 01/06/23 01/07/23 01/07/23





 22:59 06:59 14:59


 


Intake Total 1100  


 


Output Total 5  90


 


Balance 1095  -90


 


Intake:   


 


  IV 1100  


 


Output:   


 


  Drainage   90


 


    Left Lower Abdomen   90


 


  Estimated Blood Loss 5  


 


Other:   


 


  # Voids 1 0 1











GENERAL DESCRIPTION: Young male lying in bed, no distress. No tachypnea or 

accessory muscle of respiration use.


HEENT: Shows Pallor , no scleral icterus. Oral mucous membrane is dry. No 

pharyngeal erythema or thrush


NECK: Trachea central, no thyromegaly.


LUNGS: Unlabored breathing. Clear to auscultation anteriorly. No wheeze or 

crackle.


HEART: S1, S2, regular rate and rhythm. No loud murmur


ABDOMEN: Soft, abdominal incision is intact did have a blood stained drainage in

the drainage tube


EXTREMITIES: No edema of feet.


SKIN: No rash, no masses palpable.


NEUROLOGICAL: The patient is awake, alert, oriented x3, mood and affect normal.

















Results


CBC & Chem 7: 


                                 01/09/23 08:59





                                 01/07/23 07:00


Labs: 


                  Abnormal Lab Results - Last 24 Hours (Table)











  01/07/23 01/07/23 Range/Units





  07:00 07:00 


 


WBC  17.28 H   (4.50-10.00)  X 10*3/uL


 


RBC  4.02 L   (4.40-5.60)  X 10*6/uL


 


Hgb  11.6 L   (13.0-17.0)  g/dL


 


Hct  35.5 L   (39.6-50.0)  %


 


Immature Gran #  0.21 H   (0.00-0.04)  X 10*3/uL


 


Neutrophils #  14.90 H   (1.80-7.70)  X 10*3/uL


 


Sodium   134 L  (135-145)  mmol/L


 


Calcium   8.1 L  (8.7-10.3)  mg/dL


 


ALT   9 L  (10-49)  U/L


 


Total Protein   5.3 L  (6.2-8.2)  g/dL


 


Albumin   3.0 L  (3.8-4.9)  g/dL


 


Albumin/Globulin Ratio   1.30 L  (1.60-3.17)  g/dL








                      Microbiology - Last 24 Hours (Table)











 01/05/23 18:08 Blood Culture - Preliminary





 Blood    No Growth after 24 hours














Assessment and Plan


(1) Coronavirus infection


Status: Acute   Code(s): B34.2 - CORONAVIRUS INFECTION, UNSPECIFIED   SNOMED 

Code(s): 312341857


   





(2) Intra-abdominal abscess


Status: Acute   Code(s): K65.1 - PERITONEAL ABSCESS   SNOMED Code(s): 96774944


   


Plan: 


1patient is in the hospital with sepsis secondary to perforated appendicitis 

and local abscess in this patient was status post drainage of the abscess and 

abdominal drain placement we will need to cover for the enteric gram-negative 

both anaerobes and anaerobes, with abdominal cultures currently pending


2-patient did have a positive Covid test but no  respiratory symptoms


3-patient to continue with Zosyn while waiting for the cultures to finalize, no 

need for Flagyl


We will follow on clinical condition and cultures to further adjust medication 

if needed


Thank you for this consultation will follow this patient with you





Time with Patient: Greater than 30

## 2023-01-08 LAB
BASOPHILS # BLD AUTO: 0.03 X 10*3/UL (ref 0–0.1)
BASOPHILS NFR BLD AUTO: 0.4 %
EOSINOPHIL # BLD AUTO: 0.21 X 10*3/UL (ref 0.04–0.35)
EOSINOPHIL NFR BLD AUTO: 2.8 %
ERYTHROCYTE [DISTWIDTH] IN BLOOD BY AUTOMATED COUNT: 3.75 X 10*6/UL (ref 4.4–5.6)
ERYTHROCYTE [DISTWIDTH] IN BLOOD: 12.8 % (ref 11.5–14.5)
HCT VFR BLD AUTO: 33.1 % (ref 39.6–50)
HGB BLD-MCNC: 10.7 G/DL (ref 13–17)
IMM GRANULOCYTES BLD QL AUTO: 3.9 %
LYMPHOCYTES # SPEC AUTO: 1.04 X 10*3/UL (ref 0.9–5)
LYMPHOCYTES NFR SPEC AUTO: 14.1 %
MCH RBC QN AUTO: 28.5 PG (ref 27–32)
MCHC RBC AUTO-ENTMCNC: 32.3 G/DL (ref 32–37)
MCV RBC AUTO: 88.3 FL (ref 80–97)
MONOCYTES # BLD AUTO: 0.72 X 10*3/UL (ref 0.2–1)
MONOCYTES NFR BLD AUTO: 9.7 %
NEUTROPHILS # BLD AUTO: 5.1 X 10*3/UL (ref 1.8–7.7)
NEUTROPHILS NFR BLD AUTO: 69.1 %
NRBC BLD AUTO-RTO: 0 /100 WBCS (ref 0–0)
PLATELET # BLD AUTO: 295 X 10*3/UL (ref 140–440)
WBC # BLD AUTO: 7.39 X 10*3/UL (ref 4.5–10)

## 2023-01-08 RX ADMIN — HYDROMORPHONE HYDROCHLORIDE PRN MG: 1 INJECTION, SOLUTION INTRAMUSCULAR; INTRAVENOUS; SUBCUTANEOUS at 22:51

## 2023-01-08 RX ADMIN — KETOROLAC TROMETHAMINE SCH MG: 15 INJECTION, SOLUTION INTRAMUSCULAR; INTRAVENOUS at 15:59

## 2023-01-08 RX ADMIN — SIMETHICONE SCH MG: 20 SUSPENSION/ DROPS ORAL at 21:35

## 2023-01-08 RX ADMIN — KETOROLAC TROMETHAMINE SCH MG: 15 INJECTION, SOLUTION INTRAMUSCULAR; INTRAVENOUS at 04:25

## 2023-01-08 RX ADMIN — HYDROCODONE BITARTRATE AND ACETAMINOPHEN PRN EACH: 7.5; 325 TABLET ORAL at 20:35

## 2023-01-08 RX ADMIN — CEFAZOLIN SCH MLS/HR: 330 INJECTION, POWDER, FOR SOLUTION INTRAMUSCULAR; INTRAVENOUS at 02:25

## 2023-01-08 RX ADMIN — ONDANSETRON SCH MG: 2 INJECTION INTRAMUSCULAR; INTRAVENOUS at 11:15

## 2023-01-08 RX ADMIN — ONDANSETRON SCH MG: 2 INJECTION INTRAMUSCULAR; INTRAVENOUS at 18:24

## 2023-01-08 RX ADMIN — SIMETHICONE SCH MG: 20 SUSPENSION/ DROPS ORAL at 13:33

## 2023-01-08 RX ADMIN — KETOROLAC TROMETHAMINE SCH MG: 15 INJECTION, SOLUTION INTRAMUSCULAR; INTRAVENOUS at 21:35

## 2023-01-08 RX ADMIN — ONDANSETRON SCH MG: 2 INJECTION INTRAMUSCULAR; INTRAVENOUS at 06:04

## 2023-01-08 RX ADMIN — METRONIDAZOLE SCH MLS/HR: 500 INJECTION, SOLUTION INTRAVENOUS at 11:14

## 2023-01-08 RX ADMIN — KETOROLAC TROMETHAMINE SCH MG: 15 INJECTION, SOLUTION INTRAMUSCULAR; INTRAVENOUS at 11:15

## 2023-01-08 RX ADMIN — SIMETHICONE SCH MG: 20 SUSPENSION/ DROPS ORAL at 11:15

## 2023-01-08 RX ADMIN — SIMETHICONE SCH MG: 20 SUSPENSION/ DROPS ORAL at 18:25

## 2023-01-08 RX ADMIN — PANTOPRAZOLE SODIUM SCH MG: 40 INJECTION, POWDER, FOR SOLUTION INTRAVENOUS at 11:15

## 2023-01-08 RX ADMIN — HYDROCODONE BITARTRATE AND ACETAMINOPHEN PRN EACH: 7.5; 325 TABLET ORAL at 13:41

## 2023-01-08 RX ADMIN — CEFAZOLIN SCH MLS/HR: 330 INJECTION, POWDER, FOR SOLUTION INTRAMUSCULAR; INTRAVENOUS at 15:59

## 2023-01-08 RX ADMIN — METRONIDAZOLE SCH MLS/HR: 500 INJECTION, SOLUTION INTRAVENOUS at 00:08

## 2023-01-08 RX ADMIN — PIPERACILLIN AND TAZOBACTAM SCH MLS/HR: 3; .375 INJECTION, POWDER, FOR SOLUTION INTRAVENOUS at 18:24

## 2023-01-08 RX ADMIN — PIPERACILLIN AND TAZOBACTAM SCH MLS/HR: 3; .375 INJECTION, POWDER, FOR SOLUTION INTRAVENOUS at 11:14

## 2023-01-08 RX ADMIN — ENOXAPARIN SODIUM SCH MG: 30 INJECTION SUBCUTANEOUS at 11:15

## 2023-01-08 RX ADMIN — ONDANSETRON SCH MG: 2 INJECTION INTRAMUSCULAR; INTRAVENOUS at 00:08

## 2023-01-08 RX ADMIN — CEFAZOLIN SCH: 330 INJECTION, POWDER, FOR SOLUTION INTRAMUSCULAR; INTRAVENOUS at 17:16

## 2023-01-08 RX ADMIN — PIPERACILLIN AND TAZOBACTAM SCH MLS/HR: 3; .375 INJECTION, POWDER, FOR SOLUTION INTRAVENOUS at 02:25

## 2023-01-08 RX ADMIN — HYDROMORPHONE HYDROCHLORIDE PRN MG: 1 INJECTION, SOLUTION INTRAMUSCULAR; INTRAVENOUS; SUBCUTANEOUS at 00:16

## 2023-01-08 RX ADMIN — METRONIDAZOLE SCH MLS/HR: 500 INJECTION, SOLUTION INTRAVENOUS at 18:24

## 2023-01-08 RX ADMIN — METRONIDAZOLE SCH MLS/HR: 500 INJECTION, SOLUTION INTRAVENOUS at 06:05

## 2023-01-08 NOTE — P.PN
Subjective


Progress Note Date: 01/08/23


Principal diagnosis: 





Acute appendicitis





Patient feels better today.  Last night nursing staff was concerned about his 

low blood pressure and fevers.  He had a T-max of 101.  Blood pressure improved 

with IV hydration.  We checked a lactic acid level which was normal at 1.0.  

Today he feels better.  He has had loose stools.  No nausea or vomiting today.  

White blood cell count improved at 7.3.  Cultures showing gram-negative.





Objective





- Vital Signs


Vital signs: 


                                   Vital Signs











Temp  99.2 F   01/08/23 07:49


 


Pulse  91   01/08/23 07:49


 


Resp  17   01/08/23 07:49


 


BP  104/62   01/08/23 07:49


 


Pulse Ox  95   01/08/23 07:49


 


FiO2      








                                 Intake & Output











 01/07/23 01/08/23 01/08/23





 18:59 06:59 18:59


 


Intake Total  480 


 


Output Total 1430 20 


 


Balance -1430 460 


 


Intake:   


 


  Oral  480 


 


Output:   


 


  Drainage 130 20 


 


    Left Lower Abdomen 130 20 


 


  Emesis 1300  


 


Other:   


 


  Voiding Method  Toilet Toilet


 


  # Voids 2 3 


 


  # Bowel Movements  1 














- Exam





Abdomen: Soft, less distended, tenderness persist diffusely although less than 

yesterday and now is mainly in the right side of the abdomen





- Labs


CBC & Chem 7: 


                                 01/08/23 06:10





                                 01/07/23 07:00


Labs: 


                  Abnormal Lab Results - Last 24 Hours (Table)











  01/07/23 01/08/23 Range/Units





  07:00 06:10 


 


RBC   3.75 L  (4.40-5.60)  X 10*6/uL


 


Hgb   10.7 L  (13.0-17.0)  g/dL


 


Hct   33.1 L  (39.6-50.0)  %


 


Immature Gran #   0.29 H  (0.00-0.04)  X 10*3/uL


 


Sodium  134 L   (135-145)  mmol/L


 


Calcium  8.1 L   (8.7-10.3)  mg/dL


 


ALT  9 L   (10-49)  U/L


 


Total Protein  5.3 L   (6.2-8.2)  g/dL


 


Albumin  3.0 L   (3.8-4.9)  g/dL


 


Albumin/Globulin Ratio  1.30 L   (1.60-3.17)  g/dL








                      Microbiology - Last 24 Hours (Table)











 01/06/23 18:00 Gram Stain - Preliminary





 Peritoneal Fluid Body Fluid Culture - Preliminary





    Gram Neg Bacilli





    Gram Neg Bacilli#2


 


 01/05/23 18:08 Blood Culture - Preliminary





 Blood    No Growth after 48 hours


 


 01/06/23 18:00 Anaerobic Culture - Preliminary





 Peritoneal Fluid 














Assessment and Plan


(1) Acute appendicitis


Narrative/Plan: 


Patient doing better.  Begin full liquid diet.  Increase activity.  May shower. 

Oral pain meds.


Current Visit: Yes   Status: Acute   Code(s): K35.80 - UNSPECIFIED ACUTE 

APPENDICITIS   SNOMED Code(s): 84146339

## 2023-01-09 LAB
CELLS COUNTED: 200
EOSINOPHIL # BLD MANUAL: 0.31 K/UL (ref 0–0.7)
ERYTHROCYTE [DISTWIDTH] IN BLOOD BY AUTOMATED COUNT: 3.91 M/UL (ref 4.3–5.9)
ERYTHROCYTE [DISTWIDTH] IN BLOOD: 12.6 % (ref 11.5–15.5)
HCT VFR BLD AUTO: 35.2 % (ref 39–53)
HGB BLD-MCNC: 11.4 GM/DL (ref 13–17.5)
LYMPHOCYTES # BLD MANUAL: 0.85 K/UL (ref 1–4.8)
MCH RBC QN AUTO: 29.2 PG (ref 25–35)
MCHC RBC AUTO-ENTMCNC: 32.5 G/DL (ref 31–37)
MCV RBC AUTO: 90 FL (ref 80–100)
METAMYELOCYTES # BLD: 0.08 K/UL
MONOCYTES # BLD MANUAL: 0.77 K/UL (ref 0–1)
MYELOCYTES # BLD MANUAL: 0.08 K/UL
NEUTROPHILS NFR BLD MANUAL: 71 %
NEUTS SEG # BLD MANUAL: 5.7 K/UL (ref 1.3–7.7)
PLATELET # BLD AUTO: 288 K/UL (ref 150–450)
WBC # BLD AUTO: 7.7 K/UL (ref 4–11)

## 2023-01-09 RX ADMIN — KETOROLAC TROMETHAMINE SCH MG: 15 INJECTION, SOLUTION INTRAMUSCULAR; INTRAVENOUS at 16:37

## 2023-01-09 RX ADMIN — PIPERACILLIN AND TAZOBACTAM SCH MLS/HR: 3; .375 INJECTION, POWDER, FOR SOLUTION INTRAVENOUS at 02:10

## 2023-01-09 RX ADMIN — PIPERACILLIN AND TAZOBACTAM SCH MLS/HR: 3; .375 INJECTION, POWDER, FOR SOLUTION INTRAVENOUS at 09:16

## 2023-01-09 RX ADMIN — PIPERACILLIN AND TAZOBACTAM SCH MLS/HR: 3; .375 INJECTION, POWDER, FOR SOLUTION INTRAVENOUS at 17:59

## 2023-01-09 RX ADMIN — METRONIDAZOLE SCH MLS/HR: 500 INJECTION, SOLUTION INTRAVENOUS at 00:10

## 2023-01-09 RX ADMIN — SIMETHICONE SCH MG: 20 SUSPENSION/ DROPS ORAL at 21:49

## 2023-01-09 RX ADMIN — PANTOPRAZOLE SODIUM SCH MG: 40 INJECTION, POWDER, FOR SOLUTION INTRAVENOUS at 09:16

## 2023-01-09 RX ADMIN — ENOXAPARIN SODIUM SCH MG: 30 INJECTION SUBCUTANEOUS at 09:15

## 2023-01-09 RX ADMIN — SIMETHICONE SCH: 20 SUSPENSION/ DROPS ORAL at 16:37

## 2023-01-09 RX ADMIN — SIMETHICONE SCH MG: 20 SUSPENSION/ DROPS ORAL at 09:16

## 2023-01-09 RX ADMIN — KETOROLAC TROMETHAMINE SCH MG: 15 INJECTION, SOLUTION INTRAMUSCULAR; INTRAVENOUS at 09:15

## 2023-01-09 RX ADMIN — HYDROCODONE BITARTRATE AND ACETAMINOPHEN PRN EACH: 7.5; 325 TABLET ORAL at 04:48

## 2023-01-09 RX ADMIN — SIMETHICONE SCH: 20 SUSPENSION/ DROPS ORAL at 14:19

## 2023-01-09 RX ADMIN — KETOROLAC TROMETHAMINE SCH MG: 15 INJECTION, SOLUTION INTRAMUSCULAR; INTRAVENOUS at 04:14

## 2023-01-09 RX ADMIN — ONDANSETRON SCH MG: 2 INJECTION INTRAMUSCULAR; INTRAVENOUS at 00:10

## 2023-01-09 RX ADMIN — ONDANSETRON SCH MG: 2 INJECTION INTRAMUSCULAR; INTRAVENOUS at 06:09

## 2023-01-09 RX ADMIN — CEFAZOLIN SCH MLS/HR: 330 INJECTION, POWDER, FOR SOLUTION INTRAMUSCULAR; INTRAVENOUS at 09:16

## 2023-01-09 RX ADMIN — ONDANSETRON SCH MG: 2 INJECTION INTRAMUSCULAR; INTRAVENOUS at 17:59

## 2023-01-09 RX ADMIN — CEFAZOLIN SCH MLS/HR: 330 INJECTION, POWDER, FOR SOLUTION INTRAMUSCULAR; INTRAVENOUS at 16:37

## 2023-01-09 RX ADMIN — HYDROMORPHONE HYDROCHLORIDE PRN MG: 1 INJECTION, SOLUTION INTRAMUSCULAR; INTRAVENOUS; SUBCUTANEOUS at 14:17

## 2023-01-09 RX ADMIN — CEFAZOLIN SCH MLS/HR: 330 INJECTION, POWDER, FOR SOLUTION INTRAMUSCULAR; INTRAVENOUS at 00:12

## 2023-01-09 RX ADMIN — METRONIDAZOLE SCH MLS/HR: 500 INJECTION, SOLUTION INTRAVENOUS at 06:09

## 2023-01-09 RX ADMIN — HYDROCODONE BITARTRATE AND ACETAMINOPHEN PRN EACH: 7.5; 325 TABLET ORAL at 20:24

## 2023-01-09 RX ADMIN — KETOROLAC TROMETHAMINE SCH MG: 15 INJECTION, SOLUTION INTRAMUSCULAR; INTRAVENOUS at 21:50

## 2023-01-09 RX ADMIN — ONDANSETRON SCH MG: 2 INJECTION INTRAMUSCULAR; INTRAVENOUS at 14:17

## 2023-01-09 NOTE — P.PN
Subjective


Progress Note Date: 01/09/23





CHIEF COMPLAINT: Acute appendicitis





HISTORY OF PRESENT ILLNESS: Postop day #3 status post lysis of adhesions and 

drainage of periappendiceal abscess.  Patient reports that his pain is decreased

from yesterday.  He had one episode of vomiting yesterday.  He is currently on a

full liquid diet.  Afebrile.  WBC is 7.7 Hgb 11.4 platelets 288 infectious 

disease is following.  Cultures growing E. coli





PHYSICAL EXAM: 


VITAL SIGNS: Reviewed.


GENERAL: Well-developed in no acute distress. 


HEENT:  No sclera icterus. Extraocular movements grossly intact.  Moist buccal 

mucosa. Head is atraumatic, normocephalic. 


ABDOMEN:  Soft.  Mildly distended.  Tenderness right lower quadrant 


NEUROLOGIC: Alert and oriented. Cranial nerves II through XII grossly intact.





ASSESSMENT: 


1.  Gangrenous ruptured appendicitis with appendiceal abscess with phlegmon


2.  Peritonitis, diffuse and generalized


3.  Sepsis due to ruptured appendicitis


4.  Obesity due to excess calories, BMI 34.5


5.  Depressive disorder


6.  Ileus due to ruptured appendicitis


7.  Diffuse peritoneal adhesions due to infection


8.  Acute coronavirus infection


9.  Tobacco abuse disorder





PLAN: 


-Continue full liquid diet


-Antibiotics per infectious disease


-Patient may need PICC line placement discussed with infectious disease


-Continue pain medication as needed


-Encourage patient to increase activity level


[facility]





Physician Assistant note has been reviewed by physician. Signing provider agrees

with the documented findings, assessment, and plan of care. 





Objective





- Vital Signs


Vital signs: 


                                   Vital Signs











Temp  98.5 F   01/09/23 07:16


 


Pulse  72   01/09/23 07:16


 


Resp  18   01/09/23 07:16


 


BP  117/68   01/09/23 07:16


 


Pulse Ox  96   01/09/23 07:55


 


FiO2      








                                 Intake & Output











 01/08/23 01/09/23 01/09/23





 18:59 06:59 18:59


 


Output Total 600 45 


 


Balance -600 -45 


 


Output:   


 


  Drainage  45 


 


    Left Lower Abdomen  45 


 


  Emesis 600  


 


Other:   


 


  Voiding Method Toilet Toilet Toilet


 


  # Voids 3 4 














- Labs


CBC & Chem 7: 


                                 01/09/23 08:59





                                 01/07/23 07:00


Labs: 


                  Abnormal Lab Results - Last 24 Hours (Table)











  01/09/23 Range/Units





  08:59 


 


RBC  3.91 L  (4.30-5.90)  m/uL


 


Hgb  11.4 L  (13.0-17.5)  gm/dL


 


Hct  35.2 L  (39.0-53.0)  %


 


Lymphocytes # (Manual)  0.85 L  (1.0-4.8)  k/uL


 


Metamyelocytes # (Man)  0.08 H  (0)  k/uL


 


Myelocytes # (Manual)  0.08 H  (0)  k/uL








                      Microbiology - Last 24 Hours (Table)











 01/06/23 18:00 Gram Stain - Final





 Peritoneal Fluid Body Fluid Culture - Final





    Escherichia coli





    Escherichia coli#2


 


 01/06/23 18:00 Anaerobic Culture - Preliminary





 Peritoneal Fluid 


 


 01/05/23 18:08 Blood Culture - Preliminary





 Blood    No Growth after 72 hours

## 2023-01-09 NOTE — P.PN
Subjective


Progress Note Date: 01/09/23


Principal diagnosis: 


Ruptured appendicitis and intra-abdominal abscess





Patient is a 17 year old  male presented to hospital with abdominal 

pain therapy and has been going on for about 2 weeks,the patient did have a 

computed tomography scan of abdominal pelvis we did shows extensive extremity 

changes in the right lower quadrant and dilated fluid-filled appendix fluid and 

fat stranding in the right paracolic gutter, patient isstatus post drainage of 

the localized abscess RUPERTO drain placement.


On today's evaluation that is on 01/09/2023, the patient remains to be afebrile,

the patient abdominal pain has slightly decreased in intensity, the patient 

denies nausea no vomiting no chest pain shortness of breath or cough








Objective





- Vital Signs


Vital signs: 


                                   Vital Signs











Temp  98.5 F   01/09/23 07:16


 


Pulse  72   01/09/23 07:16


 


Resp  18   01/09/23 07:16


 


BP  117/68   01/09/23 07:16


 


Pulse Ox  96   01/09/23 07:55


 


FiO2      








                                 Intake & Output











 01/08/23 01/09/23 01/09/23





 18:59 06:59 18:59


 


Output Total 600 45 


 


Balance -600 -45 


 


Output:   


 


  Drainage  45 


 


    Left Lower Abdomen  45 


 


  Emesis 600  


 


Other:   


 


  Voiding Method Toilet Toilet 


 


  # Voids 3 4 














- Exam


GENERAL DESCRIPTION: Young male lying in bed in no distress





RESPIRATORY SYSTEM: Unlabored breathing , decreased breath sounds at bases





HEART: S1 S2 regular rate and rhythm ,





ABDOMEN: Soft , mild  tenderness





EXTREMITIES: No edema feet








- Labs


CBC & Chem 7: 


                                 01/09/23 08:59





                                 01/07/23 07:00


Labs: 


                  Abnormal Lab Results - Last 24 Hours (Table)











  01/08/23 01/09/23 Range/Units





  06:10 08:59 


 


RBC   3.91 L  (4.30-5.90)  m/uL


 


Hgb   11.4 L  (13.0-17.5)  gm/dL


 


Hct   35.2 L  (39.0-53.0)  %


 


Immature Gran #  0.29 H   (0.00-0.04)  X 10*3/uL








                      Microbiology - Last 24 Hours (Table)











 01/06/23 18:00 Anaerobic Culture - Preliminary





 Peritoneal Fluid 


 


 01/05/23 18:08 Blood Culture - Preliminary





 Blood    No Growth after 72 hours


 


 01/06/23 18:00 Gram Stain - Preliminary





 Peritoneal Fluid Body Fluid Culture - Preliminary





    Gram Neg Bacilli





    Gram Neg Bacilli#2














Assessment and Plan


(1) Intra-abdominal abscess


Current Visit: Yes   Status: Acute   Code(s): K65.1 - PERITONEAL ABSCESS   

SNOMED Code(s): 02374553


   





(2) Coronavirus infection


Current Visit: Yes   Status: Acute   Code(s): B34.2 - CORONAVIRUS INFECTION, 

UNSPECIFIED   SNOMED Code(s): 079264257


   


Plan: 


1patient is in the hospital with sepsis secondary to perforated appendicitis 

and local abscess in this patient was status post drainage of the abscess and 

abdominal drain placement we will need to cover for the enteric gram-negative 

both anaerobes and anaerobes, with abdominal cultures currently pending


2-patient did have a positive Covid test but no  respiratory symptoms, treatment

is mostly supportive


3-patient seemed to have shown clinical improvement unfortunately per case 

manager the patient did not have insurance and cannot afford Outpatient IV 

antibiotics we will continue Zosyn for now with the  discharge antibiotics on 

the basis of final culture


Time with Patient: Less than 30

## 2023-01-09 NOTE — P.PN
Subjective


Progress Note Date: 01/08/23


Principal diagnosis: 


Ruptured appendicitis and intra-abdominal abscess





Patient is a 17 year old  male presented to hospital with abdominal 

pain therapy and has been going on for about 2 weeks,the patient did have a 

computed tomography scan of abdominal pelvis we did shows extensive extremity 

changes in the right lower quadrant and dilated fluid-filled appendix fluid and 

fat stranding in the right paracolic gutter, patient isstatus post drainage of 

the localized abscess RUPERTO drain placement.


On today's evaluation that is on 01/08/2023, the patient denies having any fever

or any chills, still complaining of abdominal pain seemed to have slightly 

decreased in intensity from nausea no vomiting no chest pain shortness of breath

or cough








Objective





- Vital Signs


Vital signs: 


                                   Vital Signs











Temp  97.7 F   01/08/23 13:19


 


Pulse  83   01/08/23 15:54


 


Resp  20   01/08/23 13:19


 


BP  106/69   01/08/23 15:54


 


Pulse Ox  94 L  01/08/23 13:19


 


FiO2      








                                 Intake & Output











 01/07/23 01/08/23 01/08/23





 18:59 06:59 18:59


 


Intake Total  480 


 


Output Total 1430 20 600


 


Balance -1430 460 -600


 


Intake:   


 


  Oral  480 


 


Output:   


 


  Drainage 130 20 


 


    Left Lower Abdomen 130 20 


 


  Emesis 1300  600


 


Other:   


 


  Voiding Method  Toilet Toilet


 


  # Voids 2 3 2


 


  # Bowel Movements  1 














- Exam


GENERAL DESCRIPTION: Young male lying in bed in no distress





RESPIRATORY SYSTEM: Unlabored breathing , decreased breath sounds at bases





HEART: S1 S2 regular rate and rhythm ,





ABDOMEN: Soft , mild  tenderness





EXTREMITIES: No edema feet








- Labs


CBC & Chem 7: 


                                 01/08/23 06:10





                                 01/07/23 07:00


Labs: 


                  Abnormal Lab Results - Last 24 Hours (Table)











  01/08/23 Range/Units





  06:10 


 


RBC  3.75 L  (4.40-5.60)  X 10*6/uL


 


Hgb  10.7 L  (13.0-17.0)  g/dL


 


Hct  33.1 L  (39.6-50.0)  %


 


Immature Gran #  0.29 H  (0.00-0.04)  X 10*3/uL








                      Microbiology - Last 24 Hours (Table)











 01/06/23 18:00 Gram Stain - Preliminary





 Peritoneal Fluid Body Fluid Culture - Preliminary





    Gram Neg Bacilli





    Gram Neg Bacilli#2


 


 01/05/23 18:08 Blood Culture - Preliminary





 Blood    No Growth after 48 hours


 


 01/06/23 18:00 Anaerobic Culture - Preliminary





 Peritoneal Fluid 














Assessment and Plan


(1) Intra-abdominal abscess


Current Visit: Yes   Status: Acute   Code(s): K65.1 - PERITONEAL ABSCESS   

SNOMED Code(s): 18304174


   





(2) Coronavirus infection


Current Visit: Yes   Status: Acute   Code(s): B34.2 - CORONAVIRUS INFECTION, 

UNSPECIFIED   SNOMED Code(s): 477106307


   


Plan: 


1patient is in the hospital with sepsis secondary to perforated appendicitis 

and local abscess in this patient was status post drainage of the abscess and 

abdominal drain placement we will need to cover for the enteric gram-negative 

both anaerobes and anaerobes, with abdominal cultures currently pending


2-patient did have a positive Covid test but no  respiratory symptoms, treatment

is mostly supportive


3-patient to continue with Zosyn while waiting for the cultures to finalize, to 

determine his discharge antibiotics


Time with Patient: Less than 30

## 2023-01-10 VITALS
SYSTOLIC BLOOD PRESSURE: 125 MMHG | RESPIRATION RATE: 17 BRPM | DIASTOLIC BLOOD PRESSURE: 78 MMHG | TEMPERATURE: 98.3 F | HEART RATE: 65 BPM

## 2023-01-10 RX ADMIN — CEFAZOLIN SCH MLS/HR: 330 INJECTION, POWDER, FOR SOLUTION INTRAMUSCULAR; INTRAVENOUS at 00:13

## 2023-01-10 RX ADMIN — CEFAZOLIN SCH MLS/HR: 330 INJECTION, POWDER, FOR SOLUTION INTRAMUSCULAR; INTRAVENOUS at 08:10

## 2023-01-10 RX ADMIN — PANTOPRAZOLE SODIUM SCH MG: 40 INJECTION, POWDER, FOR SOLUTION INTRAVENOUS at 08:11

## 2023-01-10 RX ADMIN — CEFAZOLIN SCH: 330 INJECTION, POWDER, FOR SOLUTION INTRAMUSCULAR; INTRAVENOUS at 13:54

## 2023-01-10 RX ADMIN — PIPERACILLIN AND TAZOBACTAM SCH MLS/HR: 3; .375 INJECTION, POWDER, FOR SOLUTION INTRAVENOUS at 02:17

## 2023-01-10 RX ADMIN — ENOXAPARIN SODIUM SCH MG: 30 INJECTION SUBCUTANEOUS at 08:11

## 2023-01-10 RX ADMIN — SIMETHICONE SCH: 20 SUSPENSION/ DROPS ORAL at 13:54

## 2023-01-10 RX ADMIN — KETOROLAC TROMETHAMINE SCH MG: 15 INJECTION, SOLUTION INTRAMUSCULAR; INTRAVENOUS at 08:10

## 2023-01-10 RX ADMIN — KETOROLAC TROMETHAMINE SCH MG: 15 INJECTION, SOLUTION INTRAMUSCULAR; INTRAVENOUS at 04:10

## 2023-01-10 RX ADMIN — HYDROCODONE BITARTRATE AND ACETAMINOPHEN PRN EACH: 7.5; 325 TABLET ORAL at 06:09

## 2023-01-10 RX ADMIN — ONDANSETRON SCH MG: 2 INJECTION INTRAMUSCULAR; INTRAVENOUS at 05:33

## 2023-01-10 RX ADMIN — HYDROCODONE BITARTRATE AND ACETAMINOPHEN PRN EACH: 7.5; 325 TABLET ORAL at 14:29

## 2023-01-10 RX ADMIN — ONDANSETRON SCH MG: 2 INJECTION INTRAMUSCULAR; INTRAVENOUS at 00:13

## 2023-01-10 RX ADMIN — PIPERACILLIN AND TAZOBACTAM SCH MLS/HR: 3; .375 INJECTION, POWDER, FOR SOLUTION INTRAVENOUS at 08:10

## 2023-01-10 RX ADMIN — ONDANSETRON SCH: 2 INJECTION INTRAMUSCULAR; INTRAVENOUS at 13:54

## 2023-01-10 RX ADMIN — SIMETHICONE SCH MG: 20 SUSPENSION/ DROPS ORAL at 08:11

## 2023-01-10 NOTE — P.DS
Providers


Date of admission: 


01/05/23 03:13





Expected date of discharge: 01/10/23


Attending physician: 


Jaylin Kirby





Consults: 





                                        





01/06/23 18:20


Consult Physician Routine 


   Consulting Provider: Jakub Quick


   Consult Reason/Comments: Perforated appenditis, sepsis, COVID antibiotic 

management


   Do you want consulting provider notified?: Yes











Primary care physician: 


Jorge García





[facility] Course: 





Discharge diagnosis


1.  Gangrenous ruptured appendicitis with appendiceal abscess with phlegmon


2.  Peritonitis, diffuse and generalized


3.  Sepsis due to ruptured appendicitis


4.  Obesity due to excess calories, BMI 34.5


5.  Depressive disorder


6.  Ileus due to ruptured appendicitis


7.  Diffuse peritoneal adhesions due to infection


8.  Acute coronavirus infection


9.  Tobacco abuse disorder








Hospital course





This is a 17-year-old male who's been complaining of right lower quadrant 

abdominal pain for 1 week.  Patient has been at the [facility].  

Patient initially went to [facility] and had a computed tomography scan 

and pelvis which showed extensive inflammatory changes in the right lower 

quadrant with dilated fluid-filled appendix and fluid and fat stranding in the 

right pericolic gutter.  Small pneumoperitoneum.  Free fluid in the pelvis.  

This is consistent with appendicitis and rupture.  Patient therefore required a 

transfer to [facility] for surgical evaluation.  Patient did have a 

repeat computed tomography scan abdomen and pelvis at this facility which was 

consistent with perforated appendicitis and small volume pneumoperitoneum and 

large palpable peritoneal fluid.  Patient status post lysis of adhesions and 

drainage of periappendiceal abscess with RUPERTO drain placement and peritoneal 

lavage.  Patient followed by infectious disease.  Cultured E. coli.  Patient 

will be discharged with Cipro and Flagyl per ID recommendations.  Patient is 

afebrile.  He is tolerating diet.  He is having bowel movements.  He is stable 

for discharge.





Physician Assistant note has been reviewed by physician. Signing provider agrees

with the documented findings, assessment, and plan of care. 


Patient Condition at Discharge: Stable





Plan - Discharge Summary


Discharge Rx Participant: No


New Discharge Prescriptions: 


New


   Simethicone 40 mg/0.6 ml Drops [Mylicon Drops] 80 mg PO PCHS  ml


   Ibuprofen [Motrin] 600 mg PO Q8HR PRN #30 tab


     PRN Reason: Pain


   HYDROcodone/APAP 5-325MG [Norco 5-325] 1 tab PO Q6HR PRN 3 Days #12 tab


     PRN Reason: Pain


   Ciprofloxacin HCl [Cipro] 500 mg PO Q12HR 14 Days #28 tab


   metroNIDAZOLE [Flagyl] 500 mg PO TID 14 Days #42 tab





Continue


   ARIPiprazole [Abilify] 20 mg PO HS


   traZODone HCL [Desyrel] 50 mg PO DAILY


   Escitalopram [Lexapro] 20 mg PO DAILY


   Benztropine Mesylate [Cogentin] 1 mg PO BID


   ARIPiprazole [Abilify] 5 mg PO HS


Discharge Medication List





ARIPiprazole [Abilify] 5 mg PO HS 01/05/23 [History]


ARIPiprazole [Abilify] 20 mg PO HS 01/05/23 [History]


Benztropine Mesylate [Cogentin] 1 mg PO BID 01/05/23 [History]


Escitalopram [Lexapro] 20 mg PO DAILY 01/05/23 [History]


traZODone HCL [Desyrel] 50 mg PO DAILY 01/05/23 [History]


Ciprofloxacin HCl [Cipro] 500 mg PO Q12HR 14 Days #28 tab 01/10/23 [Rx]


HYDROcodone/APAP 5-325MG [Norco 5-325] 1 tab PO Q6HR PRN 3 Days #12 tab 01/10/23

[Rx]


Ibuprofen [Motrin] 600 mg PO Q8HR PRN #30 tab 01/10/23 [Rx]


Simethicone 40 mg/0.6 ml Drops [Mylicon Drops] 80 mg PO PCHS  ml 01/10/23 [Rx]


metroNIDAZOLE [Flagyl] 500 mg PO TID 14 Days #42 tab 01/10/23 [Rx]








Follow up Appointment(s)/Referral(s): 


Jaylin Kirby MD [STAFF PHYSICIAN] - 01/17/23


Jorge García NPC [Primary Care Provider] - 1-2 days


Activity/Diet/Wound Care/Special Instructions: 


No driving while taking Norco


No lifting over 10 pounds


You may shower. No soaking or tub baths for 2 weeks


Very light activity until you are reevaluated at your follow up appointment with

your surgeon





Keep a log of RUPERTO drain output and bring with you to your follow-up appointment


Milk/strip drains 2-3 times a day


Discharge Disposition: HOME WITH HOME HEALTH SERVICES

## 2023-01-10 NOTE — P.PN
Subjective


Progress Note Date: 01/10/23


Principal diagnosis: 


Ruptured appendicitis and intra-abdominal abscess





Patient is a 17 year old  male presented to hospital with abdominal 

pain therapy and has been going on for about 2 weeks,the patient did have a 

computed tomography scan of abdominal pelvis we did shows extensive extremity 

changes in the right lower quadrant and dilated fluid-filled appendix fluid and 

fat stranding in the right paracolic gutter, patient isstatus post drainage of 

the localized abscess RUPERTO drain placement.


On today's evaluation that is on 01/10/2023, the patient continues to be afeb

rile, the patient abdominal pain has decreased in intensity and the patient had 

been tolerating his diet denies any nausea or vomiting, the patient denies chest

pain shortness of breath or cough








Objective





- Vital Signs


Vital signs: 


                                   Vital Signs











Temp  98.5 F   01/10/23 06:58


 


Pulse  67   01/10/23 06:58


 


Resp  18   01/10/23 06:58


 


BP  123/81   01/10/23 06:58


 


Pulse Ox  92 L  01/10/23 06:58


 


FiO2      








                                 Intake & Output











 01/09/23 01/10/23 01/10/23





 18:59 06:59 18:59


 


Intake Total  930 


 


Output Total 20 20 


 


Balance -20 910 


 


Intake:   


 


  Oral  930 


 


Output:   


 


  Drainage 20 20 


 


    Left Lower Abdomen 20 20 


 


Other:   


 


  Voiding Method Toilet Toilet 


 


  # Voids 2 2 














- Exam


GENERAL DESCRIPTION: Young male lying in bed in no distress





RESPIRATORY SYSTEM: Unlabored breathing , decreased breath sounds at bases





HEART: S1 S2 regular rate and rhythm ,





ABDOMEN: Soft , mild  tenderness





EXTREMITIES: No edema feet








- Labs


CBC & Chem 7: 


                                 01/09/23 08:59





                                 01/07/23 07:00


Labs: 


                      Microbiology - Last 24 Hours (Table)











 01/05/23 18:08 Blood Culture - Preliminary





 Blood    No Growth after 96 hours


 


 01/06/23 18:00 Gram Stain - Final





 Peritoneal Fluid Body Fluid Culture - Final





    Escherichia coli





    Escherichia coli#2


 


 01/06/23 18:00 Anaerobic Culture - Preliminary





 Peritoneal Fluid 














Assessment and Plan


(1) Intra-abdominal abscess


Status: Acute   Code(s): K65.1 - PERITONEAL ABSCESS   SNOMED Code(s): 31252602


   





(2) Coronavirus infection


Status: Acute   Code(s): B34.2 - CORONAVIRUS INFECTION, UNSPECIFIED   SNOMED 

Code(s): 799833757


   


Plan: 


1patient is in the hospital with sepsis secondary to perforated appendicitis 

and local abscess in this patient was status post drainage of the abscess and 

abdominal drain placement we will need to cover for the enteric gram-negative 

both anaerobes and anaerobes, with abdominal cultures currently pending


2-patient did have a positive Covid test but no  respiratory symptoms, treatment

is mostly supportive


3-patient seemed to have shown clinical improvement unfortunately per case 

manager the patient did not have insurance and cannot afford Outpatient IV 

antibiotics local culture did grow E. coli which is a sensitive pathogen as oral

Cipro and Flagyl has good bioavailability we will advised a two-week course of 

oral Cipro and Flagyl and a close outpatient follow-up to discuss with NP for 

surgical tape

## 2023-01-27 ENCOUNTER — HOSPITAL ENCOUNTER (EMERGENCY)
Dept: HOSPITAL 47 - EC | Age: 18
LOS: 1 days | Discharge: HOME | End: 2023-01-28
Payer: COMMERCIAL

## 2023-01-27 DIAGNOSIS — Z91.013: ICD-10-CM

## 2023-01-27 DIAGNOSIS — F17.200: ICD-10-CM

## 2023-01-27 DIAGNOSIS — K57.90: Primary | ICD-10-CM

## 2023-01-27 DIAGNOSIS — J45.909: ICD-10-CM

## 2023-01-27 DIAGNOSIS — F12.90: ICD-10-CM

## 2023-01-27 DIAGNOSIS — Z88.8: ICD-10-CM

## 2023-01-27 PROCEDURE — 96374 THER/PROPH/DIAG INJ IV PUSH: CPT

## 2023-01-27 PROCEDURE — 83605 ASSAY OF LACTIC ACID: CPT

## 2023-01-27 PROCEDURE — 82150 ASSAY OF AMYLASE: CPT

## 2023-01-27 PROCEDURE — 96361 HYDRATE IV INFUSION ADD-ON: CPT

## 2023-01-27 PROCEDURE — 36415 COLL VENOUS BLD VENIPUNCTURE: CPT

## 2023-01-27 PROCEDURE — 85025 COMPLETE CBC W/AUTO DIFF WBC: CPT

## 2023-01-27 PROCEDURE — 80053 COMPREHEN METABOLIC PANEL: CPT

## 2023-01-27 PROCEDURE — 99284 EMERGENCY DEPT VISIT MOD MDM: CPT

## 2023-01-27 PROCEDURE — 83690 ASSAY OF LIPASE: CPT

## 2023-01-28 VITALS — HEART RATE: 76 BPM | RESPIRATION RATE: 16 BRPM | DIASTOLIC BLOOD PRESSURE: 72 MMHG | SYSTOLIC BLOOD PRESSURE: 114 MMHG

## 2023-01-28 VITALS — TEMPERATURE: 98.5 F

## 2023-01-28 LAB
ALBUMIN SERPL-MCNC: 4.1 G/DL (ref 3.5–5)
ALP SERPL-CCNC: 63 U/L (ref 38–126)
ALT SERPL-CCNC: 28 U/L (ref 4–49)
AMYLASE SERPL-CCNC: 86 U/L (ref 30–110)
ANION GAP SERPL CALC-SCNC: 8 MMOL/L
AST SERPL-CCNC: 37 U/L (ref 17–59)
BASOPHILS # BLD AUTO: 0.1 K/UL (ref 0–0.2)
BASOPHILS NFR BLD AUTO: 1 %
BUN SERPL-SCNC: 11 MG/DL (ref 9–20)
CALCIUM SPEC-MCNC: 9.2 MG/DL (ref 8.4–10.2)
CHLORIDE SERPL-SCNC: 106 MMOL/L (ref 98–107)
CO2 SERPL-SCNC: 24 MMOL/L (ref 22–30)
EOSINOPHIL # BLD AUTO: 0.2 K/UL (ref 0–0.7)
EOSINOPHIL NFR BLD AUTO: 3 %
ERYTHROCYTE [DISTWIDTH] IN BLOOD BY AUTOMATED COUNT: 4.56 M/UL (ref 4.3–5.9)
ERYTHROCYTE [DISTWIDTH] IN BLOOD: 13.7 % (ref 11.5–15.5)
GLUCOSE SERPL-MCNC: 91 MG/DL (ref 74–99)
HCT VFR BLD AUTO: 38.9 % (ref 39–53)
HGB BLD-MCNC: 13.1 GM/DL (ref 13–17.5)
LIPASE SERPL-CCNC: 152 U/L (ref 23–300)
LYMPHOCYTES # SPEC AUTO: 2.6 K/UL (ref 1–4.8)
LYMPHOCYTES NFR SPEC AUTO: 26 %
MCH RBC QN AUTO: 28.8 PG (ref 25–35)
MCHC RBC AUTO-ENTMCNC: 33.8 G/DL (ref 31–37)
MCV RBC AUTO: 85.2 FL (ref 80–100)
MONOCYTES # BLD AUTO: 0.4 K/UL (ref 0–1)
MONOCYTES NFR BLD AUTO: 4 %
NEUTROPHILS # BLD AUTO: 6.2 K/UL (ref 1.3–7.7)
NEUTROPHILS NFR BLD AUTO: 64 %
PLATELET # BLD AUTO: 349 K/UL (ref 150–450)
POTASSIUM SERPL-SCNC: 4.5 MMOL/L (ref 3.5–5.1)
PROT SERPL-MCNC: 7.7 G/DL (ref 6.3–8.2)
SODIUM SERPL-SCNC: 138 MMOL/L (ref 137–145)
WBC # BLD AUTO: 9.7 K/UL (ref 4–11)

## 2023-01-28 NOTE — ED
Abdominal Pain HPI





- General


Chief Complaint: Abdominal Pain


Stated Complaint: Abd Pain


Time Seen by Provider: 01/28/23 00:17


Source: patient, RN notes reviewed, old records reviewed


Mode of arrival: ambulatory


Limitations: no limitations





- History of Present Illness


Initial Comments: 





This is a nontoxic-appearing 17-year-old male who presents to the emergency room

with complaints of left-sided abdominal pain.  Patient states that he was 

diagnosed with an infection around his appendix on January 5 by Dr. Kirby 

and a drainage tube placed.  He did see Dr. Kirby for follow-up on Wednesday

and states drain was removed. Patient states he developed pain again on Friday 

and went to [facility] and they did a CT scan and stated that he has had 

abdominal infection and discharged him home.  Patient states he has been on 

Cipro and Flagyl since January 5.  He denies any fevers, no nausea vomiting or 

diarrhea.  No chest pain or shortness of breath.  He also takes benztropine, 

Lexapro, probiotics and Motrin.


MD Complaint: abdominal pain


-: days(s) (2)


Location: [facility]


Severity scale (1-10): 8


Consistency: constant


Associated Symptoms: denies other symptoms


Treatments Prior to Arrival: NSAIDs, other (Antibiotics Flagyl and Cipro)





- Related Data


                                Home Medications











 Medication  Instructions  Recorded  Confirmed


 


ARIPiprazole [Abilify] 5 mg PO HS 01/05/23 01/05/23


 


ARIPiprazole [Abilify] 20 mg PO HS 01/05/23 01/05/23


 


Benztropine Mesylate [Cogentin] 1 mg PO BID 01/05/23 01/05/23


 


Escitalopram [Lexapro] 20 mg PO DAILY 01/05/23 01/05/23


 


traZODone HCL [Desyrel] 50 mg PO DAILY 01/05/23 01/05/23








                                  Previous Rx's











 Medication  Instructions  Recorded


 


Ciprofloxacin HCl [Cipro] 500 mg PO Q12HR 14 Days #28 tab 01/10/23


 


HYDROcodone/APAP 5-325MG [Norco 1 tab PO Q6HR PRN 3 Days #12 tab 01/10/23





5-325]  


 


Ibuprofen [Motrin] 600 mg PO Q8HR PRN #30 tab 01/10/23


 


Simethicone 40 mg/0.6 ml Drops 80 mg PO [facility]S  ml 01/10/23





[Mylicon Drops]  


 


metroNIDAZOLE [Flagyl] 500 mg PO TID 14 Days #42 tab 01/10/23











                                    Allergies











Allergy/AdvReac Type Severity Reaction Status Date / Time


 


haloperidol [From Haldol] Allergy  Swelling Verified 01/28/23 00:25


 


seafood Allergy  Unknown Uncoded 01/28/23 00:25














Review of Systems


ROS Statement: 


Those systems with pertinent positive or pertinent negative responses have been 

documented in the HPI.





ROS Other: All systems not noted in ROS Statement are negative.





Past Medical History


Past Medical History: Asthma


History of Any Multi-Drug Resistant Organisms: None Reported


Additional Past Surgical History / Comment(s): Right knee surgery


Past Anesthesia/Blood Transfusion Reactions: No Reported Reaction


Past Psychological History: No Psychological Hx Reported


Smoking Status: Current every day smoker


Past Alcohol Use History: Occasional


Past Drug Use History: Marijuana





- Past Family History


  ** Father


History Unknown: Yes





  ** Mother


History Unknown: Yes





General Exam


Limitations: no limitations


General appearance: alert, in no apparent distress


Head exam: Present: atraumatic, normocephalic, normal inspection


Eye exam: Present: normal appearance.  Absent: scleral icterus, conjunctival 

injection, periorbital swelling


Respiratory exam: Present: normal lung sounds bilaterally.  Absent: respiratory 

distress, wheezes, rales, rhonchi, stridor, chest wall tenderness, accessory 

muscle use


Cardiovascular Exam: Present: tachycardia


GI/Abdominal exam: Present: soft, tenderness, other (Surgical incisions intact 

with no area of erythema or drainage).  Absent: distended, rigid


Extremities exam: Present: normal capillary refill


Back exam: Present: normal inspection.  Absent: tenderness, CVA tenderness (R), 

CVA tenderness (L), rash noted


Neurological exam: Present: alert, oriented X3


Psychiatric exam: Present: normal affect, normal mood


Skin exam: Present: warm, dry, normal color.  Absent: cyanosis, diaphoretic, 

petechiae, pallor





Course


                                   Vital Signs











  01/28/23 01/28/23 01/28/23





  00:26 00:31 01:31


 


Temperature 98.5 F  


 


Pulse Rate 107 H 107 H 76


 


Respiratory 22  16





Rate   


 


Blood Pressure 130/83  114/72


 


O2 Sat by Pulse 98  99





Oximetry   














Medical Decision Making





- Medical Decision Making





Medical records were obtained from [facility], CT abdomen and 

pelvis with IV contrast was performed on 01/27/2023 showing dilated appendix wit

h fat stranding consistent with acute appendicitis.  No evidence of traumatic 

injury in abdomen and pelvis.  These findings were consistent with prior 

ruptured appendix from last week, appendix with a 2 cm to 1.5 cm, including 

dilated appendix and fat stranding, but no other new acute traumatic findings.


Patient states that the drain was pulled by Dr. Kirby on Wednesday after 

placed on January 5 for ruptured appendix.  He has been on Cipro and Flagyl 

since January 5.  He is also taking benztropine, Lexapro, probiotics and Motrin 

daily.


Patient was given IV fluids and Toradol with relief of his discomfort.


No evidence of leukocytosis.  Electrolytes are unremarkable.


Vital signs are stable and patient is afebrile.


I did speak with Dr. Stiles and with shared decision making patient is agreeable 

to being discharged home, continuing antibiotics and following up with Dr. Kirby on Monday.  Strict return parameters were discussed for increased 

pain, persistent nausea vomiting or fevers.  He is agreeable to this plan of 

care.  Case discussed with Dr. Lugo


Was pt. sent in by a medical professional or institution?


@  -no


Did you speak to anyone other than the patient for history?  


@  -no


Did you review nursing and triage notes? 


@  -yes i agree


Were old charts reviewed? 


@  -Outside hospital records from [facility]


Differential Diagnosis? 


@  -Differential Abdominal Pain Men:


Appendicitis, cholecystitis, diverticulosis, ischemic bowel, pancreatitis, 

hepatitis, UTI, gastroenteritis, AAA, incarcerated hernia, bowel obstruction, 

constipation, inflammatory bowel, hepatitis, peptic ulcer disease, splenic 

infarction, perforated viscus,  this is not meant to be an all-inclusive list





What testing was considered but not performed? (CT, X-rays, U/S, labs)? Why?


@CT was considered however CT report was obtained from [facility] that 

was performed on January 27 and reviewed, significant abdominal pain, no 

evidence of leukocytosis or fever necessitating a repeat abdominal CT


What meds were considered but not given? Why?


@  -none


Did you discuss the management of the patient with other professionals?


@  -Dr Stiles surgeon


Did you reconcile home meds?


@  -no


Was smoking cessation discussed for >3mins.?


@  -no


Was critical care preformed (if so, how long)?


@  -no


Were there social determinants of health that impacted care today? How? 

(Homelessness, low income, unemployed, alcoholism, drug addiction, 

transportation, low edu. Level, literacy, decrease access to med. care, penitentiary, 

rehab)?


@  -no


Was there de-escalation of care discussed even if they declined? (Discuss DNR or

withdrawal of care, Hospice)?


@  -no


What co-morbidities impacted this encounter? (DM, HTN, Smoking, COPD, CAD, 

Cancer, CVA, Hep., AIDS, mental health diagnosis, sleep apnea, morbid obesity)?


@  -asthma, smoker


Was patient admitted / discharged?


@  -discharged 


Undiagnosed new problem with uncertain prognosis?


@  -[none]


Drug Therapy requiring intensive monitoring for toxicity (Heparin, Nitro, 

Insulin, Cardizem)?


@  -no


Were any procedures done?


@  -no


Diagnosis/symptom?


@  -Abdominal pain status post appendectomy


Acute, or Chronic, or Acute on Chronic?


@  -acute


Uncomplicated (without systemic symptoms) or Complicated (systemic symptoms)?


@  -Complicated


Side effects of treatment?


@  -[none]


Exacerbation, Progression, or Severe Exacerbation]


@  -[no]


Poses a threat to life or bodily function?


@  -yes, directed to f/u with surgeon on Monday and return with any new or 

concerning symptoms including increased pain or fevers








- Lab Data


Result diagrams: 


                                 01/28/23 00:59





                                 01/28/23 00:59


                                   Lab Results











  01/28/23 01/28/23 01/28/23 Range/Units





  00:59 00:59 00:59 


 


WBC  9.7    (4.0-11.0)  k/uL


 


RBC  4.56    (4.30-5.90)  m/uL


 


Hgb  13.1    (13.0-17.5)  gm/dL


 


Hct  38.9 L    (39.0-53.0)  %


 


MCV  85.2    (80.0-100.0)  fL


 


MCH  28.8    (25.0-35.0)  pg


 


MCHC  33.8    (31.0-37.0)  g/dL


 


RDW  13.7    (11.5-15.5)  %


 


Plt Count  349    (150-450)  k/uL


 


MPV  8.3    


 


Neutrophils %  64    %


 


Lymphocytes %  26    %


 


Monocytes %  4    %


 


Eosinophils %  3    %


 


Basophils %  1    %


 


Neutrophils #  6.2    (1.3-7.7)  k/uL


 


Lymphocytes #  2.6    (1.0-4.8)  k/uL


 


Monocytes #  0.4    (0-1.0)  k/uL


 


Eosinophils #  0.2    (0-0.7)  k/uL


 


Basophils #  0.1    (0-0.2)  k/uL


 


Sodium   138   (137-145)  mmol/L


 


Potassium   4.5   (3.5-5.1)  mmol/L


 


Chloride   106   ()  mmol/L


 


Carbon Dioxide   24   (22-30)  mmol/L


 


Anion Gap   8   mmol/L


 


BUN   11   (9-20)  mg/dL


 


Creatinine   0.63 L   (0.66-1.25)  mg/dL


 


Est GFR (CKD-EPI)AfAm   >90   (>60 ml/min/1.73 sqM)  


 


Est GFR (CKD-EPI)NonAf   >90   (>60 ml/min/1.73 sqM)  


 


Glucose   91   (74-99)  mg/dL


 


Plasma Lactic Acid Kenneth    1.4  (0.7-2.0)  mmol/L


 


Calcium   9.2   (8.4-10.2)  mg/dL


 


Total Bilirubin   0.5   (0.2-1.3)  mg/dL


 


AST   37   (17-59)  U/L


 


ALT   28   (4-49)  U/L


 


Alkaline Phosphatase   63   ()  U/L


 


Total Protein   7.7   (6.3-8.2)  g/dL


 


Albumin   4.1   (3.5-5.0)  g/dL


 


Amylase   86   ()  U/L


 


Lipase   152   ()  U/L














Disposition


Clinical Impression: 


 Abdominal pain





Disposition: HOME SELF-CARE


Condition: Good


Instructions (If sedation given, give patient instructions):  Abdominal Pain 

(ED)


Additional Instructions: 


Continue taking the antibiotics as previously prescribed. Folllow-up with Dr. Kirby next week.  Return to the emergency room with any new or concerning 

symptoms including increased pain, persistent nausea vomiting or fevers.


Is patient prescribed a controlled substance at d/c from ED?: No


Referrals: 


Jorge García NPC [Primary Care Provider] - 1-2 days


Jaylin Kirby MD [STAFF PHYSICIAN] - 1-2 days


Time of Disposition: 02:17

## 2023-03-06 ENCOUNTER — HOSPITAL ENCOUNTER (EMERGENCY)
Dept: HOSPITAL 47 - EC | Age: 18
Discharge: HOME | End: 2023-03-06
Payer: COMMERCIAL

## 2023-03-06 VITALS — RESPIRATION RATE: 16 BRPM

## 2023-03-06 VITALS — SYSTOLIC BLOOD PRESSURE: 130 MMHG | TEMPERATURE: 98.3 F | HEART RATE: 101 BPM | DIASTOLIC BLOOD PRESSURE: 83 MMHG

## 2023-03-06 DIAGNOSIS — R10.9: Primary | ICD-10-CM

## 2023-03-06 DIAGNOSIS — J45.909: ICD-10-CM

## 2023-03-06 DIAGNOSIS — F17.200: ICD-10-CM

## 2023-03-06 DIAGNOSIS — Z88.8: ICD-10-CM

## 2023-03-06 DIAGNOSIS — F12.90: ICD-10-CM

## 2023-03-06 DIAGNOSIS — Z91.013: ICD-10-CM

## 2023-03-06 LAB
ALBUMIN SERPL-MCNC: 4.5 G/DL (ref 3.5–5)
ALP SERPL-CCNC: 64 U/L (ref 38–126)
ALT SERPL-CCNC: 26 U/L (ref 4–49)
AMYLASE SERPL-CCNC: 72 U/L (ref 30–110)
ANION GAP SERPL CALC-SCNC: 11 MMOL/L
AST SERPL-CCNC: 29 U/L (ref 17–59)
BASOPHILS # BLD AUTO: 0 K/UL (ref 0–0.2)
BASOPHILS NFR BLD AUTO: 1 %
BUN SERPL-SCNC: 14 MG/DL (ref 9–20)
CALCIUM SPEC-MCNC: 9.5 MG/DL (ref 8.4–10.2)
CHLORIDE SERPL-SCNC: 104 MMOL/L (ref 98–107)
CO2 SERPL-SCNC: 25 MMOL/L (ref 22–30)
EOSINOPHIL # BLD AUTO: 0.2 K/UL (ref 0–0.7)
EOSINOPHIL NFR BLD AUTO: 2 %
ERYTHROCYTE [DISTWIDTH] IN BLOOD BY AUTOMATED COUNT: 4.84 M/UL (ref 4.3–5.9)
ERYTHROCYTE [DISTWIDTH] IN BLOOD: 13.8 % (ref 11.5–15.5)
GLUCOSE SERPL-MCNC: 135 MG/DL (ref 74–99)
HCT VFR BLD AUTO: 41.7 % (ref 39–53)
HGB BLD-MCNC: 14.7 GM/DL (ref 13–17.5)
LIPASE SERPL-CCNC: 131 U/L (ref 23–300)
LYMPHOCYTES # SPEC AUTO: 2.3 K/UL (ref 1–4.8)
LYMPHOCYTES NFR SPEC AUTO: 24 %
MCH RBC QN AUTO: 30.4 PG (ref 25–35)
MCHC RBC AUTO-ENTMCNC: 35.2 G/DL (ref 31–37)
MCV RBC AUTO: 86.3 FL (ref 80–100)
MONOCYTES # BLD AUTO: 0.3 K/UL (ref 0–1)
MONOCYTES NFR BLD AUTO: 3 %
NEUTROPHILS # BLD AUTO: 6.5 K/UL (ref 1.3–7.7)
NEUTROPHILS NFR BLD AUTO: 69 %
PLATELET # BLD AUTO: 368 K/UL (ref 150–450)
POTASSIUM SERPL-SCNC: 4.6 MMOL/L (ref 3.5–5.1)
PROT SERPL-MCNC: 8 G/DL (ref 6.3–8.2)
SODIUM SERPL-SCNC: 140 MMOL/L (ref 137–145)
WBC # BLD AUTO: 9.4 K/UL (ref 4–11)

## 2023-03-06 PROCEDURE — 82150 ASSAY OF AMYLASE: CPT

## 2023-03-06 PROCEDURE — 96374 THER/PROPH/DIAG INJ IV PUSH: CPT

## 2023-03-06 PROCEDURE — 36415 COLL VENOUS BLD VENIPUNCTURE: CPT

## 2023-03-06 PROCEDURE — 80053 COMPREHEN METABOLIC PANEL: CPT

## 2023-03-06 PROCEDURE — 96361 HYDRATE IV INFUSION ADD-ON: CPT

## 2023-03-06 PROCEDURE — 99284 EMERGENCY DEPT VISIT MOD MDM: CPT

## 2023-03-06 PROCEDURE — 83605 ASSAY OF LACTIC ACID: CPT

## 2023-03-06 PROCEDURE — 85025 COMPLETE CBC W/AUTO DIFF WBC: CPT

## 2023-03-06 PROCEDURE — 83690 ASSAY OF LIPASE: CPT

## 2023-03-24 ENCOUNTER — HOSPITAL ENCOUNTER (EMERGENCY)
Dept: HOSPITAL 47 - EC | Age: 18
Discharge: HOME | End: 2023-03-24
Payer: COMMERCIAL

## 2023-03-24 VITALS — HEART RATE: 78 BPM

## 2023-03-24 VITALS — RESPIRATION RATE: 20 BRPM | TEMPERATURE: 98 F | DIASTOLIC BLOOD PRESSURE: 66 MMHG | SYSTOLIC BLOOD PRESSURE: 123 MMHG

## 2023-03-24 DIAGNOSIS — Z79.899: ICD-10-CM

## 2023-03-24 DIAGNOSIS — J45.909: ICD-10-CM

## 2023-03-24 DIAGNOSIS — F41.9: ICD-10-CM

## 2023-03-24 DIAGNOSIS — F12.90: ICD-10-CM

## 2023-03-24 DIAGNOSIS — Z91.013: ICD-10-CM

## 2023-03-24 DIAGNOSIS — F31.9: Primary | ICD-10-CM

## 2023-03-24 DIAGNOSIS — F17.200: ICD-10-CM

## 2023-03-24 DIAGNOSIS — Z88.8: ICD-10-CM

## 2023-03-24 PROCEDURE — 82075 ASSAY OF BREATH ETHANOL: CPT

## 2023-03-24 PROCEDURE — 94640 AIRWAY INHALATION TREATMENT: CPT

## 2023-03-24 PROCEDURE — 71046 X-RAY EXAM CHEST 2 VIEWS: CPT

## 2023-03-24 PROCEDURE — 99285 EMERGENCY DEPT VISIT HI MDM: CPT

## 2023-03-24 NOTE — XR
EXAMINATION TYPE: XR chest 2V

 

DATE OF EXAM: 3/24/2023 1:11 PM

 

COMPARISON: None

 

TECHNIQUE: XR chest 2V Frontal and lateral views of the chest.

 

CLINICAL INDICATION:Male, 17 years old with history of cough; 

 

FINDINGS: 

Lungs/Pleura: There is no evidence of pleural effusion, focal consolidation, or pneumothorax.  Mildly
 elevated right hemidiaphragm.

Pulmonary vascularity: Unremarkable.

Heart/mediastinum: Cardiomediastinal silhouette is unremarkable.

Musculoskeletal: No acute osseous pathology.

 

 

IMPRESSION: 

No acute cardiopulmonary disease/process.

## 2023-03-24 NOTE — ED
General Adult HPI





- General


Chief complaint: Psychiatric Symptoms


Stated complaint: EPS eval


Time Seen by Provider: 03/24/23 12:21


Source: patient, RN notes reviewed


Mode of arrival: ambulatory


Limitations: no limitations





- History of Present Illness


Initial comments: 


17 year old  male with a past psychiatric medical history significant 

for anxiety and depression presents the emergency department with suicidal 

ideation.  Patient reports that he was recently kicked out of his HOUSE today.  

He reports that he feels he is down on his left and has had worsening depressive

soft.  She admits to feeling like it would be better if he were just gone.  He 

denies any active plan.  He denies any homicidal ideation.  He denies any 

auditory or visual hallucinations.  Denies any recent alcohol or illicit drug 

use.





- Related Data


                                Home Medications











 Medication  Instructions  Recorded  Confirmed


 


ARIPiprazole [Abilify] 20 mg PO HS 01/05/23 03/24/23


 


Benztropine Mesylate [Cogentin] 1 mg PO BID 01/05/23 03/24/23


 


Escitalopram Oxalate [Lexapro] 10 mg PO DAILY 03/01/23 03/24/23


 


ARIPiprazole [Abilify] 5 mg PO DAILY 03/24/23 03/24/23











                                    Allergies











Allergy/AdvReac Type Severity Reaction Status Date / Time


 


haloperidol [From Haldol] Allergy  Swelling Verified 03/24/23 13:26


 


shellfish derived [Shellfish] Allergy  Swelling Verified 03/24/23 13:26


 


seafood Allergy  Swelling Uncoded 03/24/23 12:35














Review of Systems


ROS Statement: 


Those systems with pertinent positive or pertinent negative responses have been 

documented in the HPI.





ROS Other: All systems not noted in ROS Statement are negative.





Past Medical History


Past Medical History: Asthma


Additional Past Medical History / Comment(s): appendicitis with rupture 1/23 

sent home with RUPERTO drain. unable to do appendectomy at that time due to 

infection. Planned for March, but pain became too severe so came [facility]


History of Any Multi-Drug Resistant Organisms: None Reported


Additional Past Surgical History / Comment(s): Right knee surgery


Past Anesthesia/Blood Transfusion Reactions: No Reported Reaction


Past Psychological History: ADD/ADHD, Anxiety, Bipolar, Depression, PTSD


Smoking Status: Current every day smoker


Past Alcohol Use History: Occasional


Past Drug Use History: Marijuana





- Past Family History


  ** Father


History Unknown: Yes





  ** Mother


History Unknown: Yes





General Exam


Limitations: no limitations


General appearance: alert, in no apparent distress


Head exam: Present: atraumatic, normocephalic, normal inspection


Eye exam: Present: normal appearance, PERRL, EOMI.  Absent: scleral icterus, 

conjunctival injection, periorbital swelling


ENT exam: Present: normal exam, mucous membranes moist


Neck exam: Present: normal inspection.  Absent: tenderness, meningismus, 

lymphadenopathy


Respiratory exam: Present: normal lung sounds bilaterally, wheezes.  Absent: 

respiratory distress, rales, rhonchi, stridor


Cardiovascular Exam: Present: regular rate, normal rhythm, normal heart sounds. 

 Absent: systolic murmur, diastolic murmur, rubs, gallop, clicks


GI/Abdominal exam: Present: soft, normal bowel sounds.  Absent: distended, 

tenderness, guarding, rebound, rigid


Extremities exam: Present: normal inspection, full ROM, normal capillary refill.

  Absent: tenderness, pedal edema, joint swelling, calf tenderness


Back exam: Present: normal inspection


Neurological exam: Present: alert, oriented X3, CN II-XII intact


Psychiatric exam: Present: normal affect, normal mood


Skin exam: Present: warm, dry, intact, normal color.  Absent: rash





Course


                                   Vital Signs











  03/24/23 03/24/23 03/24/23





  12:28 14:03 14:10


 


Temperature 98.0 F  


 


Pulse Rate 69 78 78


 


Respiratory 20  





Rate   


 


Blood Pressure 123/66  


 


O2 Sat by Pulse 98  





Oximetry   














- Reevaluation(s)


Reevaluation #1: 





03/24/23 16:37


Starr, EPS RN notes in to evaluate the patient.  Lung sounds were auscultated 

and no audible wheezing present status post albuterol treatment.


03/24/23 18:35





Reevaluation #2: 





03/24/23 17:29


Pt re-evaluated. Agrees with the plan for discharge. 





Medical Decision Making





- Medical Decision Making





Was pt. sent in by a medical professional or institution (, PA, NP, urgent 

care, hospital, or nursing home...) When possible be specific


@  -[No]


Did you speak to anyone other than the patient for history (EMS, parent, family,

 police, friend...)? What history was obtained from this source 


@  -[No]


Did you review nursing and triage notes (agree or disagree)?  Why? 


@  -[I reviewed and agree with nursing and triage notes]


Were old charts reviewed (outside hosp., previous admission, EMS record, old 

EKG, old radiological studies, urgent care reports/EKG's, nursing home records)?

Report findings 


@  -[No old charts were reviewed]


Differential Diagnosis (chest pain, altered mental status, abdominal pain women,

abdominal pain men, vaginal bleeding, weakness, fever, dyspnea, syncope, 

headache, dizziness, GI bleed, back pain, seizure, CVA, palpatations, mental 

health, musculoskeletal)? 


@  -[not applicable]


EKG interpreted by me (3pts min.).


@  -[As above]


X-rays interpreted by me (1pt min.).


@  -Chest x-ray negative for any acute injury pleural process


CT interpreted by me (1pt min.).


@  -[None done]


U/S interpreted by me (1pt. min.).


@  -[None done]


What testing was considered but not performed or refused? (CT, X-rays, U/S, 

labs)? Why?


@  -[None]


What meds were considered but not given or refused? Why?


@  -[None]


Did you discuss the management of the patient with other professionals 

(professionals i.e. , PA, NP, lab, RT, psych nurse, , , 

teacher, , )? Give summary


@  -[No]


Was smoking cessation discussed for >3mins.?


@  -[No]


Was critical care preformed (if so, how long)?


@  -[No]


Were there social determinants of health that impacted care today? How? 

(Homelessness, low income, unemployed, alcoholism, drug addiction, 

transportation, low edu. Level, literacy, decrease access to med. care, [facility], 

rehab)?


@  -[No]


Was there de-escalation of care discussed even if they declined (Discuss DNR or 

withdrawal of care, Hospice)? DNR status


@  -[No]


What co-morbidities impacted this encounter? (DM, HTN, Smoking, COPD, CAD, 

Cancer, CVA, ARF, Chemo, Hep., AIDS, mental health diagnosis, sleep apnea, 

morbid obesity)?


@  -[None]


Was patient admitted / discharged? Hospital course, mention meds given and 

route, prescriptions, significant lab abnormalities, going to OR and other 

pertinent info.


@  -Discharged.  This is a 17-year-old  male who presents with 

depression.  Patient had a thorough history and physical exam performed initial 

physical exam reveals expiratory wheezes for which the patient was given an 

albuterol treatment before.  Chest x-ray negative for any evidence of 

consolidation.  He was evaluated by YANETH Clements who believes the patient is 

stable enough for discharge.  Patient discharged in stable condition. Case 

discussed with RIAN Ulrich 


Undiagnosed new problem with uncertain prognosis?


@  -[No]


Drug Therapy requiring intensive monitoring for toxicity (Heparin, Nitro, 

Insulin, Cardizem)?


@  -[No]


Were any procedures done?


@  -[No]


Diagnosis/symptom?


@  -depression 


Acute, or Chronic, or Acute on Chronic?


@  -acute 


Uncomplicated (without systemic symptoms) or Complicated (systemic symptoms)?


@  -uncomplicated 


Side effects of treatment?


@  -[No]


Exacerbation, Progression, or Severe Exacerbation?


@  -[No]


Poses a threat to life or bodily function? How? (Chest pain, USA, MI, pneumonia,

 PE, COPD, DKA, ARF, appy, cholecystitis, CVA, Diverticulitis, Homicidal, 

Suicidal, threat to staff... and all critical care pts)


@  -low likelihood 





Disposition


Clinical Impression: 


 Depression





Disposition: HOME SELF-CARE


Condition: Stable


Additional Instructions: 


PLease return to the nearest emergency department if symptoms worsen or persist.

 


Is patient prescribed a controlled substance at d/c from ED?: No


Referrals: 


David Harp MD [Primary Care Provider] - 1-2 days


Time of Disposition: 17:32